# Patient Record
Sex: FEMALE | Race: BLACK OR AFRICAN AMERICAN | Employment: STUDENT | URBAN - METROPOLITAN AREA
[De-identification: names, ages, dates, MRNs, and addresses within clinical notes are randomized per-mention and may not be internally consistent; named-entity substitution may affect disease eponyms.]

---
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
## 2023-07-03 ENCOUNTER — APPOINTMENT (OUTPATIENT)
Dept: OTHER | Facility: HOSPITAL | Age: 18
End: 2023-07-03
Attending: PREVENTIVE MEDICINE

## 2023-07-05 ENCOUNTER — APPOINTMENT (OUTPATIENT)
Dept: OTHER | Facility: HOSPITAL | Age: 18
End: 2023-07-05
Attending: PREVENTIVE MEDICINE

## 2023-08-01 ENCOUNTER — APPOINTMENT (OUTPATIENT)
Dept: GENERAL RADIOLOGY | Age: 18
End: 2023-08-01
Attending: STUDENT IN AN ORGANIZED HEALTH CARE EDUCATION/TRAINING PROGRAM
Payer: MEDICAID

## 2023-08-01 ENCOUNTER — HOSPITAL ENCOUNTER (EMERGENCY)
Age: 18
Discharge: HOME | End: 2023-08-01
Attending: STUDENT IN AN ORGANIZED HEALTH CARE EDUCATION/TRAINING PROGRAM
Payer: MEDICAID

## 2023-08-01 ENCOUNTER — OFFICE VISIT (OUTPATIENT)
Dept: FAMILY MEDICINE CLINIC | Facility: CLINIC | Age: 18
End: 2023-08-01
Payer: MEDICAID

## 2023-08-01 VITALS
DIASTOLIC BLOOD PRESSURE: 87 MMHG | OXYGEN SATURATION: 99 % | SYSTOLIC BLOOD PRESSURE: 133 MMHG | WEIGHT: 160 LBS | HEIGHT: 65 IN | RESPIRATION RATE: 16 BRPM | HEART RATE: 112 BPM | BODY MASS INDEX: 26.66 KG/M2 | TEMPERATURE: 98 F

## 2023-08-01 VITALS
TEMPERATURE: 98 F | WEIGHT: 160 LBS | OXYGEN SATURATION: 100 % | SYSTOLIC BLOOD PRESSURE: 118 MMHG | HEIGHT: 65 IN | RESPIRATION RATE: 18 BRPM | HEART RATE: 88 BPM | DIASTOLIC BLOOD PRESSURE: 86 MMHG | BODY MASS INDEX: 26.66 KG/M2

## 2023-08-01 DIAGNOSIS — R06.02 SHORTNESS OF BREATH: Primary | ICD-10-CM

## 2023-08-01 DIAGNOSIS — J45.21 MILD INTERMITTENT ASTHMA WITH EXACERBATION: Primary | ICD-10-CM

## 2023-08-01 DIAGNOSIS — Z87.09 HISTORY OF ASTHMA: ICD-10-CM

## 2023-08-01 PROCEDURE — 3008F BODY MASS INDEX DOCD: CPT | Performed by: NURSE PRACTITIONER

## 2023-08-01 PROCEDURE — 99284 EMERGENCY DEPT VISIT MOD MDM: CPT

## 2023-08-01 PROCEDURE — 3079F DIAST BP 80-89 MM HG: CPT | Performed by: NURSE PRACTITIONER

## 2023-08-01 PROCEDURE — 99213 OFFICE O/P EST LOW 20 MIN: CPT | Performed by: NURSE PRACTITIONER

## 2023-08-01 PROCEDURE — 93010 ELECTROCARDIOGRAM REPORT: CPT

## 2023-08-01 PROCEDURE — 3074F SYST BP LT 130 MM HG: CPT | Performed by: NURSE PRACTITIONER

## 2023-08-01 PROCEDURE — 93005 ELECTROCARDIOGRAM TRACING: CPT

## 2023-08-01 PROCEDURE — 94640 AIRWAY INHALATION TREATMENT: CPT

## 2023-08-01 PROCEDURE — 71045 X-RAY EXAM CHEST 1 VIEW: CPT | Performed by: STUDENT IN AN ORGANIZED HEALTH CARE EDUCATION/TRAINING PROGRAM

## 2023-08-01 RX ORDER — ALBUTEROL SULFATE 90 UG/1
8 AEROSOL, METERED RESPIRATORY (INHALATION) ONCE
Status: COMPLETED | OUTPATIENT
Start: 2023-08-01 | End: 2023-08-01

## 2023-08-01 RX ORDER — NORETHINDRONE ACETATE AND ETHINYL ESTRADIOL 1; .02 MG/1; MG/1
1 TABLET ORAL DAILY
COMMUNITY
End: 2023-08-18

## 2023-08-01 RX ORDER — ALBUTEROL SULFATE 2.5 MG/3ML
2.5 SOLUTION RESPIRATORY (INHALATION) EVERY 4 HOURS PRN
Qty: 30 EACH | Refills: 0 | Status: SHIPPED | OUTPATIENT
Start: 2023-08-01 | End: 2023-08-31

## 2023-08-01 RX ORDER — PREDNISONE 20 MG/1
60 TABLET ORAL ONCE
Status: COMPLETED | OUTPATIENT
Start: 2023-08-01 | End: 2023-08-01

## 2023-08-01 RX ORDER — PREDNISONE 20 MG/1
40 TABLET ORAL DAILY
Qty: 10 TABLET | Refills: 0 | Status: SHIPPED | OUTPATIENT
Start: 2023-08-01 | End: 2023-08-06

## 2023-08-02 LAB
ATRIAL RATE: 84 BPM
P AXIS: 74 DEGREES
P-R INTERVAL: 144 MS
Q-T INTERVAL: 352 MS
QRS DURATION: 76 MS
QTC CALCULATION (BEZET): 415 MS
R AXIS: 82 DEGREES
T AXIS: 47 DEGREES
VENTRICULAR RATE: 84 BPM

## 2023-08-18 ENCOUNTER — OFFICE VISIT (OUTPATIENT)
Dept: FAMILY MEDICINE CLINIC | Facility: CLINIC | Age: 18
End: 2023-08-18
Payer: MEDICAID

## 2023-08-18 VITALS
RESPIRATION RATE: 18 BRPM | HEART RATE: 87 BPM | TEMPERATURE: 98 F | BODY MASS INDEX: 26.66 KG/M2 | SYSTOLIC BLOOD PRESSURE: 126 MMHG | WEIGHT: 160 LBS | DIASTOLIC BLOOD PRESSURE: 84 MMHG | HEIGHT: 65 IN | OXYGEN SATURATION: 98 %

## 2023-08-18 DIAGNOSIS — J45.20 MILD INTERMITTENT ASTHMA WITHOUT COMPLICATION: ICD-10-CM

## 2023-08-18 DIAGNOSIS — G89.29 CHRONIC BILATERAL LOW BACK PAIN WITHOUT SCIATICA: ICD-10-CM

## 2023-08-18 DIAGNOSIS — G43.009 MIGRAINE WITHOUT AURA AND WITHOUT STATUS MIGRAINOSUS, NOT INTRACTABLE: Primary | ICD-10-CM

## 2023-08-18 DIAGNOSIS — M54.50 CHRONIC BILATERAL LOW BACK PAIN WITHOUT SCIATICA: ICD-10-CM

## 2023-08-18 DIAGNOSIS — N92.6 IRREGULAR MENSTRUAL CYCLE: ICD-10-CM

## 2023-08-18 DIAGNOSIS — M54.6 CHRONIC BILATERAL THORACIC BACK PAIN: ICD-10-CM

## 2023-08-18 DIAGNOSIS — F31.9 BIPOLAR 1 DISORDER (HCC): ICD-10-CM

## 2023-08-18 DIAGNOSIS — G89.29 CHRONIC BILATERAL THORACIC BACK PAIN: ICD-10-CM

## 2023-08-18 PROCEDURE — 99204 OFFICE O/P NEW MOD 45 MIN: CPT | Performed by: FAMILY MEDICINE

## 2023-08-18 PROCEDURE — 3074F SYST BP LT 130 MM HG: CPT | Performed by: FAMILY MEDICINE

## 2023-08-18 PROCEDURE — 3008F BODY MASS INDEX DOCD: CPT | Performed by: FAMILY MEDICINE

## 2023-08-18 PROCEDURE — 3079F DIAST BP 80-89 MM HG: CPT | Performed by: FAMILY MEDICINE

## 2023-08-18 RX ORDER — SUMATRIPTAN 25 MG/1
25 TABLET, FILM COATED ORAL EVERY 2 HOUR PRN
Qty: 30 TABLET | Refills: 2 | Status: SHIPPED | OUTPATIENT
Start: 2023-08-18 | End: 2023-09-17

## 2023-08-18 RX ORDER — QUETIAPINE FUMARATE 300 MG/1
300 TABLET, FILM COATED ORAL 2 TIMES DAILY
COMMUNITY

## 2023-08-18 RX ORDER — NORETHINDRONE ACETATE AND ETHINYL ESTRADIOL, ETHINYL ESTRADIOL AND FERROUS FUMARATE 1MG-10(24)
1 KIT ORAL DAILY
COMMUNITY
Start: 2023-05-12

## 2023-08-18 RX ORDER — ONDANSETRON 4 MG/1
4 TABLET, ORALLY DISINTEGRATING ORAL EVERY 8 HOURS PRN
Qty: 21 TABLET | Refills: 0 | Status: SHIPPED | OUTPATIENT
Start: 2023-08-18 | End: 2023-08-25

## 2023-08-18 RX ORDER — ALBUTEROL SULFATE 90 UG/1
2 AEROSOL, METERED RESPIRATORY (INHALATION) EVERY 4 HOURS PRN
COMMUNITY
Start: 2022-09-02

## 2023-08-23 ENCOUNTER — PATIENT MESSAGE (OUTPATIENT)
Dept: FAMILY MEDICINE CLINIC | Facility: CLINIC | Age: 18
End: 2023-08-23

## 2023-09-11 ENCOUNTER — TELEPHONE (OUTPATIENT)
Dept: FAMILY MEDICINE CLINIC | Facility: CLINIC | Age: 18
End: 2023-09-11

## 2023-10-04 ENCOUNTER — PATIENT MESSAGE (OUTPATIENT)
Dept: FAMILY MEDICINE CLINIC | Facility: CLINIC | Age: 18
End: 2023-10-04

## 2023-10-05 ENCOUNTER — TELEPHONE (OUTPATIENT)
Dept: FAMILY MEDICINE CLINIC | Facility: CLINIC | Age: 18
End: 2023-10-05

## 2023-10-05 NOTE — TELEPHONE ENCOUNTER
From: Cherrie Reyez  To: Nina Youssef  Sent: 10/4/2023 8:33 PM CDT  Subject: Urgent! Hi Dr. Kelsey Overall,    I would like to kindly request that you sign the highlighted fields of this sheet and get it back to me, please. Thank you!

## 2023-10-05 NOTE — TELEPHONE ENCOUNTER
Patient needs appointment for physical, 8/18/23 was not a physical, she had multiple concerns addressed at that appointment, please see notes and visit diagnosis.

## 2023-10-05 NOTE — TELEPHONE ENCOUNTER
Mom, Tushar Duarte, dropped off paperwork for Medical Report on an Adult in a [facility] Form that needs to be completed/signed by Dr. Ailyn Jimenez. Pt was seen for her physical DOS 8/18/23. Form was placed in Dr. Andrea Wong in-box.

## 2023-10-06 NOTE — TELEPHONE ENCOUNTER
Pt scheduled for    Future Appointments   Date Time Provider [facility]   10/11/2023  9:40 AM Ravi Hudson MD [facility]     Work physical

## 2023-10-11 ENCOUNTER — OFFICE VISIT (OUTPATIENT)
Dept: FAMILY MEDICINE CLINIC | Facility: CLINIC | Age: 18
End: 2023-10-11
Payer: MEDICAID

## 2023-10-11 VITALS
SYSTOLIC BLOOD PRESSURE: 122 MMHG | HEIGHT: 65 IN | OXYGEN SATURATION: 98 % | BODY MASS INDEX: 27.16 KG/M2 | RESPIRATION RATE: 18 BRPM | TEMPERATURE: 98 F | WEIGHT: 163 LBS | HEART RATE: 87 BPM | DIASTOLIC BLOOD PRESSURE: 80 MMHG

## 2023-10-11 DIAGNOSIS — Z00.00 ROUTINE GENERAL MEDICAL EXAMINATION AT A HEALTH CARE FACILITY: Primary | ICD-10-CM

## 2023-10-11 PROCEDURE — 3079F DIAST BP 80-89 MM HG: CPT | Performed by: FAMILY MEDICINE

## 2023-10-11 PROCEDURE — 3074F SYST BP LT 130 MM HG: CPT | Performed by: FAMILY MEDICINE

## 2023-10-11 PROCEDURE — 99395 PREV VISIT EST AGE 18-39: CPT | Performed by: FAMILY MEDICINE

## 2023-10-11 PROCEDURE — 3008F BODY MASS INDEX DOCD: CPT | Performed by: FAMILY MEDICINE

## 2023-11-06 ENCOUNTER — OFFICE VISIT (OUTPATIENT)
Dept: FAMILY MEDICINE CLINIC | Facility: CLINIC | Age: 18
End: 2023-11-06
Payer: MEDICAID

## 2023-11-06 VITALS
SYSTOLIC BLOOD PRESSURE: 118 MMHG | WEIGHT: 160 LBS | TEMPERATURE: 98 F | BODY MASS INDEX: 26.66 KG/M2 | HEART RATE: 98 BPM | RESPIRATION RATE: 16 BRPM | HEIGHT: 65 IN | OXYGEN SATURATION: 98 % | DIASTOLIC BLOOD PRESSURE: 86 MMHG

## 2023-11-06 DIAGNOSIS — J06.9 UPPER RESPIRATORY TRACT INFECTION, UNSPECIFIED TYPE: Primary | ICD-10-CM

## 2023-11-06 PROCEDURE — 3079F DIAST BP 80-89 MM HG: CPT | Performed by: NURSE PRACTITIONER

## 2023-11-06 PROCEDURE — 3074F SYST BP LT 130 MM HG: CPT | Performed by: NURSE PRACTITIONER

## 2023-11-06 PROCEDURE — 87637 SARSCOV2&INF A&B&RSV AMP PRB: CPT | Performed by: NURSE PRACTITIONER

## 2023-11-06 PROCEDURE — 99213 OFFICE O/P EST LOW 20 MIN: CPT | Performed by: NURSE PRACTITIONER

## 2023-11-06 PROCEDURE — 3008F BODY MASS INDEX DOCD: CPT | Performed by: NURSE PRACTITIONER

## 2023-11-07 LAB
FLUAV + FLUBV RNA SPEC NAA+PROBE: NEGATIVE
FLUAV + FLUBV RNA SPEC NAA+PROBE: NEGATIVE
RSV RNA SPEC NAA+PROBE: POSITIVE
SARS-COV-2 RNA RESP QL NAA+PROBE: NOT DETECTED

## 2024-04-09 ENCOUNTER — HOSPITAL ENCOUNTER (OUTPATIENT)
Age: 19
Discharge: HOME | End: 2024-04-09
Payer: MEDICAID

## 2024-04-09 VITALS
DIASTOLIC BLOOD PRESSURE: 76 MMHG | OXYGEN SATURATION: 99 % | HEART RATE: 83 BPM | TEMPERATURE: 97 F | WEIGHT: 170 LBS | HEIGHT: 65 IN | RESPIRATION RATE: 18 BRPM | SYSTOLIC BLOOD PRESSURE: 120 MMHG | BODY MASS INDEX: 28.32 KG/M2

## 2024-04-09 DIAGNOSIS — J02.9 SORE THROAT: ICD-10-CM

## 2024-04-09 DIAGNOSIS — J01.40 ACUTE NON-RECURRENT PANSINUSITIS: Primary | ICD-10-CM

## 2024-04-09 DIAGNOSIS — J02.9 VIRAL PHARYNGITIS: ICD-10-CM

## 2024-04-09 LAB
POCT INFLUENZA A: NEGATIVE
POCT INFLUENZA B: NEGATIVE
S PYO AG THROAT QL: NEGATIVE
SARS-COV-2 RNA RESP QL NAA+PROBE: NOT DETECTED

## 2024-04-09 PROCEDURE — 87880 STREP A ASSAY W/OPTIC: CPT | Performed by: PHYSICIAN ASSISTANT

## 2024-04-09 PROCEDURE — 99204 OFFICE O/P NEW MOD 45 MIN: CPT | Performed by: PHYSICIAN ASSISTANT

## 2024-04-09 PROCEDURE — 87502 INFLUENZA DNA AMP PROBE: CPT | Performed by: PHYSICIAN ASSISTANT

## 2024-04-09 PROCEDURE — U0002 COVID-19 LAB TEST NON-CDC: HCPCS | Performed by: PHYSICIAN ASSISTANT

## 2024-04-09 RX ORDER — AMOXICILLIN AND CLAVULANATE POTASSIUM 875; 125 MG/1; MG/1
1 TABLET, FILM COATED ORAL 2 TIMES DAILY
Qty: 20 TABLET | Refills: 0 | Status: SHIPPED | OUTPATIENT
Start: 2024-04-09 | End: 2024-04-19

## 2024-04-09 RX ORDER — LORATADINE 10 MG/1
10 TABLET ORAL DAILY
Qty: 30 TABLET | Refills: 0 | Status: SHIPPED | OUTPATIENT
Start: 2024-04-09 | End: 2024-05-09

## 2024-04-09 RX ORDER — FLUTICASONE PROPIONATE 50 MCG
2 SPRAY, SUSPENSION (ML) NASAL DAILY
Qty: 16 G | Refills: 0 | Status: SHIPPED | OUTPATIENT
Start: 2024-04-09 | End: 2024-05-09

## 2024-04-09 NOTE — DISCHARGE INSTRUCTIONS
Increase fluids and rest  Ibuprofen and tylenol as needed  Use the flonase as we discussed for at least 2 weeks   Use the antihistamine daily for at least 2 weeks   Take augmentin twice a day until gone   Follow up with primary care doctor in 48 hours  Return to the ER if symptoms worsen

## 2024-04-09 NOTE — ED INITIAL ASSESSMENT (HPI)
PT C/O MULTIPLE SYMPTOMS FOR THE LAST 3 DAYS. PT C/O SORE THROAT, SHORTNESS OF BREATH, BODY ACHE AND COUGH. PT STATES SHE'S HAD INTERMITTENT SYMPTOMS FOR SEVERAL WEEKS.  PT STATES SHE IS ASTHMATIC.

## 2024-04-09 NOTE — ED PROVIDER NOTES
Patient Seen in: [facility]      History     Chief Complaint   Patient presents with    Cough/URI    Difficulty Breathing    Sore Throat    Body ache and/or chills     Stated Complaint: sore throat body aches    Subjective:   The history is provided by the patient.       19-year-old female with past med history of eczema and asthma presents to the urgent care due to throat for the past 3 days.  Subjective body aches and chills but no true fevers trismus drooling or muffled voice.  Copious nasal congestion and facial pressure for the past 2 weeks.  Had a mild dry cough but no wheezing, chest pain or shortness of breath.  Has only had increased using her albuterol inhaler roughly 3 times a week.  no other medications taken at home.    Objective:   Past Medical History:   Diagnosis Date    Asthma (HCC)     Eczema     Migraines               History reviewed. No pertinent surgical history.             Social History     Socioeconomic History    Marital status: Single   Tobacco Use    Smoking status: Never    Smokeless tobacco: Never   Vaping Use    Vaping Use: Never used   Substance and Sexual Activity    Alcohol use: Not Currently    Drug use: Never   Other Topics Concern    Caffeine Concern No    Exercise No    Seat Belt No    Special Diet No    Stress Concern No    Weight Concern No              Review of Systems   Constitutional:  Positive for chills and fatigue. Negative for fever.   HENT:  Positive for congestion, sinus pressure, sinus pain and sore throat. Negative for ear discharge, ear pain, trouble swallowing and voice change.    Respiratory: Negative.     Cardiovascular: Negative.    Gastrointestinal: Negative.    Neurological: Negative.        Positive for stated complaint: sore throat body aches  Other systems are as noted in HPI.  Constitutional and vital signs reviewed.      All other systems reviewed and negative except as noted above.    Physical Exam     ED Triage Vitals   BP  04/09/24 0932 120/76   Pulse 04/09/24 0927 83   Resp 04/09/24 0927 18   Temp 04/09/24 0927 97.4 °F (36.3 °C)   Temp src 04/09/24 0927 Oral   SpO2 04/09/24 0927 99 %   O2 Device 04/09/24 0927 None (Room air)       Current:/76   Pulse 83   Temp 97.4 °F (36.3 °C) (Oral)   Resp 18   Ht 165.1 cm (5' 5\")   Wt 77.1 kg   LMP 11/06/2023 (Approximate)   SpO2 99%   BMI 28.29 kg/m²         Physical Exam  Vitals and nursing note reviewed.   Constitutional:       General: She is not in acute distress.     Appearance: She is well-developed. She is not toxic-appearing.   HENT:      Head: Normocephalic.      Right Ear: Tympanic membrane, ear canal and external ear normal.      Left Ear: Tympanic membrane, ear canal and external ear normal.      Nose: Congestion present.      Comments: Nasal turbinated erythematous and edematous      Mouth/Throat:      Mouth: Mucous membranes are moist.      Pharynx: No posterior oropharyngeal erythema.   Eyes:      Extraocular Movements: Extraocular movements intact.      Conjunctiva/sclera: Conjunctivae normal.      Pupils: Pupils are equal, round, and reactive to light.   Cardiovascular:      Rate and Rhythm: Normal rate and regular rhythm.   Pulmonary:      Effort: Pulmonary effort is normal.      Breath sounds: Normal breath sounds.   Musculoskeletal:         General: Normal range of motion.      Cervical back: Normal range of motion.   Lymphadenopathy:      Cervical: No cervical adenopathy.   Skin:     General: Skin is warm.   Neurological:      General: No focal deficit present.      Mental Status: She is alert and oriented to person, place, and time.   Psychiatric:         Mood and Affect: Mood normal.         Behavior: Behavior normal.               ED Course     Labs Reviewed   POCT RAPID STREP - Normal   POCT FLU TEST - Normal    Narrative:     This assay is a rapid molecular in vitro test utilizing nucleic acid amplification of influenza A and B viral RNA.   RAPID SARS-COV-2  BY PCR - Normal                      MDM   Ddx-strep pharyngitis  influenza, COVID, sinusitis      Exam the patient is afebrile nontoxic.  Vital signs are stable.  She does have copious nasal congestion with nasal turbinates erythematous and boggy.  Bilateral TMs are unremarkable.  Posterior pharynx shows postnasal drip otherwise no acute findings.  Patient.  No wheezing.  Rapid strep is negative.  Patient requesting COVID and influenza testing due to her current employer meant in a .  All of which are negative. Due to worsening facial pressure and nasal congestion for over 2 weeks concern for secondary bacterial component, will prescribe flonase, Claritin along with Augmentin.  Discussed at length with the patient at home care strict return precautions and importance close follow-up.  All questions were answered and the patient is comfortable to treatment plan and discharge home                             Medical Decision Making  Problems Addressed:  Acute non-recurrent pansinusitis: acute illness or injury  Viral pharyngitis: acute illness or injury    Amount and/or Complexity of Data Reviewed  Labs: ordered. Decision-making details documented in ED Course.    Risk  OTC drugs.  Prescription drug management.        Disposition and Plan     Clinical Impression:  1. Acute non-recurrent pansinusitis    2. Sore throat    3. Viral pharyngitis         Disposition:  Discharge  4/9/2024 10:20 am    Follow-up:  Oneyda Youssef MD  [address] [place] [address]  [phone]                Medications Prescribed:  Discharge Medication List as of 4/9/2024 10:23 AM        START taking these medications    Details   fluticasone propionate 50 MCG/ACT Nasal Suspension 2 sprays by Nasal route daily., Normal, Disp-16 g, R-0      loratadine 10 MG Oral Tab Take 1 tablet (10 mg total) by mouth daily., Normal, Disp-30 tablet, R-0      amoxicillin clavulanate 875-125 MG Oral Tab Take 1 tablet by mouth 2  (two) times daily for 10 days., Normal, Disp-20 tablet, R-0

## 2024-04-16 RX ORDER — NORETHINDRONE ACETATE AND ETHINYL ESTRADIOL, ETHINYL ESTRADIOL AND FERROUS FUMARATE 1MG-10(24)
1 KIT ORAL DAILY
Qty: 28 TABLET | Refills: 0 | OUTPATIENT
Start: 2024-04-16

## 2024-04-20 ENCOUNTER — OFFICE VISIT (OUTPATIENT)
Dept: FAMILY MEDICINE CLINIC | Facility: CLINIC | Age: 19
End: 2024-04-20
Payer: MEDICAID

## 2024-04-20 VITALS
TEMPERATURE: 98 F | BODY MASS INDEX: 28.66 KG/M2 | HEART RATE: 102 BPM | RESPIRATION RATE: 18 BRPM | SYSTOLIC BLOOD PRESSURE: 110 MMHG | DIASTOLIC BLOOD PRESSURE: 82 MMHG | WEIGHT: 172 LBS | HEIGHT: 65 IN | OXYGEN SATURATION: 98 %

## 2024-04-20 DIAGNOSIS — Z12.4 SCREENING FOR CERVICAL CANCER: ICD-10-CM

## 2024-04-20 DIAGNOSIS — Z30.41 SURVEILLANCE OF PREVIOUSLY PRESCRIBED CONTRACEPTIVE PILL: Primary | ICD-10-CM

## 2024-04-20 DIAGNOSIS — Z11.3 ROUTINE SCREENING FOR STI (SEXUALLY TRANSMITTED INFECTION): ICD-10-CM

## 2024-04-20 PROCEDURE — 87624 HPV HI-RISK TYP POOLED RSLT: CPT | Performed by: FAMILY MEDICINE

## 2024-04-20 PROCEDURE — 99213 OFFICE O/P EST LOW 20 MIN: CPT | Performed by: FAMILY MEDICINE

## 2024-04-20 PROCEDURE — 87591 N.GONORRHOEAE DNA AMP PROB: CPT | Performed by: FAMILY MEDICINE

## 2024-04-20 PROCEDURE — 87491 CHLMYD TRACH DNA AMP PROBE: CPT | Performed by: FAMILY MEDICINE

## 2024-04-20 PROCEDURE — 88175 CYTOPATH C/V AUTO FLUID REDO: CPT | Performed by: FAMILY MEDICINE

## 2024-04-20 RX ORDER — NORETHINDRONE ACETATE AND ETHINYL ESTRADIOL, ETHINYL ESTRADIOL AND FERROUS FUMARATE 1MG-10(24)
1 KIT ORAL DAILY
Qty: 84 TABLET | Refills: 3 | Status: SHIPPED | OUTPATIENT
Start: 2024-04-20

## 2024-04-20 NOTE — PROGRESS NOTES
Jassi Deleon is a 19 year old female.  Chief Complaint   Patient presents with    Pap    Medication Follow-Up     HPI:   Jassi Deleon is a 19 year old female with history of bipolar disorder seen for her pap and breast exam. Periods are regular.  Needs refill on her OCP, states periods are not regular and the flow is very light on the ocp.    ALLERGY:     Allergies   Allergen Reactions    Lanolin OTHER (SEE COMMENTS)    Ra Pure Aloe OTHER (SEE COMMENTS)     MEDICATIONS:     Current Outpatient Medications   Medication Sig Dispense Refill    LO LOESTRIN FE 1 MG-10 MCG / 10 MCG Oral Tab Take 1 tablet by mouth daily. 84 tablet 3    fluticasone propionate 50 MCG/ACT Nasal Suspension 2 sprays by Nasal route daily. 16 g 0    loratadine 10 MG Oral Tab Take 1 tablet (10 mg total) by mouth daily. 30 tablet 0    QUEtiapine 300 MG Oral Tab Take 1 tablet (300 mg total) by mouth 2 (two) times daily.      albuterol 108 (90 Base) MCG/ACT Inhalation Aero Soln Inhale 2 puffs into the lungs every 4 (four) hours as needed for Shortness of Breath.      hydrOXYzine HCl 25 MG Oral Tab Take 1 to 2 tablets 30 min before bed as needed for itching. Start with 1 tab and may increase to 2 as needed.      triamcinolone acetonide 0.1 % External Cream Apply a thin layer to the affected rough areas of the body until smooth        Past Medical History:    Asthma (HCC)    Eczema    Migraines      Social History:  Social History     Socioeconomic History    Marital status: Single   Tobacco Use    Smoking status: Never    Smokeless tobacco: Never   Vaping Use    Vaping status: Never Used   Substance and Sexual Activity    Alcohol use: Not Currently    Drug use: Never   Other Topics Concern    Caffeine Concern No    Exercise No    Seat Belt No    Special Diet No    Stress Concern No    Weight Concern No     Social Determinants of Health     Financial Resource Strain: Unknown (4/22/2022)    Received from [facility],  [facility]    Overall Financial Resource Strain (CARDIA)     Difficulty of Paying Living Expenses: Patient declined   Food Insecurity: Unknown (4/22/2022)    [facility], [facility]    Hunger Vital Sign     Worried About Running Out of Food in the Last Year: Patient declined     Ran Out of Food in the Last Year: Patient declined   Transportation Needs: Unknown (4/22/2022)    [facility] from [facility], [facility]    PRAPARE - Transportation     Lack of Transportation (Medical): Patient declined     Lack of Transportation (Non-Medical): Patient declined    [facility]    Housing Stability        REVIEW OF SYSTEMS:   Review of Systems   Genitourinary:  Negative for vaginal discharge, genital sores, vaginal pain, menstrual problem, pelvic pain, sexual dysfunction, breast mass and breast pain.     EXAM:   /82   Pulse 102   Temp 98 °F (36.7 °C) (Temporal)   Resp 18   Ht 5' 5\" (1.651 m)   Wt 172 lb (78 kg)   LMP 03/24/2024 (Approximate)   SpO2 98%   BMI 28.62 kg/m²   Physical Exam  Constitutional:       Appearance: Normal appearance. She is normal weight.   Cardiovascular:      Rate and Rhythm: Normal rate and regular rhythm.      Heart sounds: No murmur heard.  Pulmonary:      Effort: Pulmonary effort is normal.      Breath sounds: Normal breath sounds.   Chest:   Breasts:     Right: Normal. No swelling, inverted nipple, mass, nipple discharge, skin change or tenderness.      Left: Normal. No swelling, inverted nipple, mass, nipple discharge, skin change or tenderness.   Abdominal:      Hernia: There is no hernia in the left inguinal area or right inguinal area.   Genitourinary:     General: Normal vulva.      Exam position: Lithotomy position.      Labia:         Right: No rash or lesion.         Left: No rash or lesion.        Urethra: No urethral lesion.      Vagina: Normal. No vaginal discharge, erythema, bleeding or lesions.      Cervix: Normal. No cervical motion tenderness, discharge, friability, lesion or erythema.      Uterus: Normal. Not deviated, not enlarged, not fixed, not tender and no uterine prolapse.       Adnexa: Right adnexa normal and left adnexa normal.        Right: No mass, tenderness or fullness.          Left: No mass, tenderness or fullness.        Rectum: Normal.   Lymphadenopathy:      Upper Body:      Right upper body: No supraclavicular, axillary or pectoral adenopathy.      Left upper body: No supraclavicular, axillary or pectoral adenopathy.      Lower Body: No right inguinal adenopathy. No left inguinal adenopathy.   Neurological:      Mental Status: She is alert.       ASSESSMENT AND PLAN:   Jassi was seen today for pap and medication follow-up.    Diagnoses and all orders for this visit:    Surveillance of previously prescribed contraceptive pill  -     LO LOESTRIN FE 1 MG-10 MCG / 10 MCG Oral Tab; Take 1 tablet by mouth daily.    Screening for cervical cancer  -     Cancel: Thin Prep Pap every 3 years with Cytology Alone; Future  -     ThinPrep PAP with HPV Reflex Request; Future  -     ThinPrep PAP with HPV Reflex Request    Routine screening for STI (sexually transmitted infection)  -     Chlamydia/Gc Amplification Urine; Future  -     Chlamydia/Gc Amplification Urine         The 21st Century Cures Act makes medical notes like these available to patients in the interest of transparency. Please be advised this is a medical document. Medical documents are intended to carry relevant information, facts as evident, and the clinical opinion of the practitioner. The medical note is intended as peer to peer communication and may appear blunt or direct. It is written in medical language and may contain abbreviations or verbiage that are unfamiliar.

## 2024-04-22 LAB
C TRACH DNA SPEC QL NAA+PROBE: NEGATIVE
N GONORRHOEA DNA SPEC QL NAA+PROBE: NEGATIVE

## 2024-04-24 LAB
.: NORMAL
.: NORMAL
HPV I/H RISK 1 DNA SPEC QL NAA+PROBE: NEGATIVE

## 2024-07-22 ENCOUNTER — OFFICE VISIT (OUTPATIENT)
Dept: FAMILY MEDICINE CLINIC | Facility: CLINIC | Age: 19
End: 2024-07-22
Payer: MEDICAID

## 2024-07-22 VITALS
RESPIRATION RATE: 18 BRPM | BODY MASS INDEX: 27.32 KG/M2 | HEART RATE: 116 BPM | WEIGHT: 164 LBS | TEMPERATURE: 98 F | HEIGHT: 65 IN | OXYGEN SATURATION: 97 % | DIASTOLIC BLOOD PRESSURE: 82 MMHG | SYSTOLIC BLOOD PRESSURE: 110 MMHG

## 2024-07-22 DIAGNOSIS — G89.29 CHRONIC MIDLINE THORACIC BACK PAIN: Primary | ICD-10-CM

## 2024-07-22 DIAGNOSIS — M54.6 CHRONIC MIDLINE THORACIC BACK PAIN: Primary | ICD-10-CM

## 2024-07-22 DIAGNOSIS — N62 LARGE BREASTS: ICD-10-CM

## 2024-07-22 PROCEDURE — 99213 OFFICE O/P EST LOW 20 MIN: CPT | Performed by: FAMILY MEDICINE

## 2024-07-22 RX ORDER — TIZANIDINE 2 MG/1
2 TABLET ORAL NIGHTLY PRN
Qty: 15 TABLET | Refills: 0 | Status: SHIPPED | OUTPATIENT
Start: 2024-07-22 | End: 2024-08-06

## 2024-07-22 NOTE — PATIENT INSTRUCTIONS
Please see if seeing a plastic surgeon is covered by medicaid, please let me know if you need a referral.

## 2024-07-22 NOTE — PROGRESS NOTES
Jassi Deleon is a 19 year old female.  Chief Complaint   Patient presents with    Back Pain     Having back pain for 10 years     Contraception     Wants to discuss birth control      HPI:   Jassi Deleon is a 19 year old female complaining of Mid back pain ongoing for a while feels it is due to her heavy breasts.  Does not get bra lines on her shoulder.Wears a sports bra and regular bra. Patient 34-36 DD-DDD current;y.    Works in  and feels the pain is worse at the end of the day, tries stretching, warm shower, massage and topical analgesic with some relief of pain but it gets worse the next day.    Feels her breasts are big, states when she sleeps has some chest pain, breast pain and back pain.    ALLERGY:     Allergies   Allergen Reactions    Lanolin OTHER (SEE COMMENTS)    Ra Pure Aloe OTHER (SEE COMMENTS)     MEDICATIONS:     Current Outpatient Medications   Medication Sig Dispense Refill    LO LOESTRIN FE 1 MG-10 MCG / 10 MCG Oral Tab Take 1 tablet by mouth daily. 84 tablet 3    QUEtiapine 300 MG Oral Tab Take 1 tablet (300 mg total) by mouth 2 (two) times daily.      albuterol 108 (90 Base) MCG/ACT Inhalation Aero Soln Inhale 2 puffs into the lungs every 4 (four) hours as needed for Shortness of Breath.      hydrOXYzine HCl 25 MG Oral Tab Take 1 to 2 tablets 30 min before bed as needed for itching. Start with 1 tab and may increase to 2 as needed.      triamcinolone acetonide 0.1 % External Cream Apply a thin layer to the affected rough areas of the body until smooth        Past Medical History:    Asthma (HCC)    Eczema    Migraines      Social History:  Social History     Socioeconomic History    Marital status: Single   Tobacco Use    Smoking status: Never    Smokeless tobacco: Never   Vaping Use    Vaping status: Never Used   Substance and Sexual Activity    Alcohol use: Not Currently    Drug use: Never   Other Topics Concern    Caffeine Concern No    Exercise No    Seat Belt No     Special Diet No    Stress Concern No    Weight Concern No     Social Determinants of Health     Financial Resource Strain: Unknown (4/22/2022)    [facility]    Overall Financial Resource Strain (CARDIA)     Difficulty of Paying Living Expenses: Patient declined   Food Insecurity: Unknown (4/22/2022)    [facility], [facility]    Hunger Vital Sign     Worried About Running Out of Food in the Last Year: Patient declined     Ran Out of Food in the Last Year: Patient declined   Transportation Needs: Unknown (4/22/2022)    [facility], [facility]    PRAPARE - Transportation     Lack of Transportation (Medical): Patient declined     Lack of Transportation (Non-Medical): Patient declined    [facility], [facility]    Housing Stability        REVIEW OF SYSTEMS:   A comprehensive 10 point review of systems was completed.  Pertinent positives and negatives noted in the the HPI.    EXAM:   /82   Pulse 116   Temp 98.2 °F (36.8 °C) (Temporal)   Resp 18   Ht 5' 5\" (1.651 m)   Wt 164 lb (74.4 kg)   LMP 06/26/2024 (Approximate)   SpO2 97%   BMI 27.29 kg/m²   GENERAL: well developed, well nourished,in no apparent distress  NECK: supple, no tenderness to palpation  LUNGS: clear to auscultation  CARDIO: RRR without murmur  BACK: no tenderness to palpation over the thoracic spine, + mild spasm of trapezius.    ASSESSMENT AND PLAN:   Jassi was seen today for back pain and contraception.    Diagnoses and all orders for this visit:    Chronic midline thoracic back pain  -     Physical Therapy Referral - [facility]  -     tiZANidine 2 MG Oral Tab; Take 1 tablet (2 mg total) by mouth nightly as needed.    Large breasts       - advised to see if seeing a plastic surgeon for breast  reduction surgery is covered by medicaid.       - can call with name of surgeon if referral is needed.       The 21st Century Cures Act makes medical notes like these available to patients in the interest of transparency. Please be advised this is a medical document. Medical documents are intended to carry relevant information, facts as evident, and the clinical opinion of the practitioner. The medical note is intended as peer to peer communication and may appear blunt or direct. It is written in medical language and may contain abbreviations or verbiage that are unfamiliar.

## 2024-07-25 ENCOUNTER — TELEPHONE (OUTPATIENT)
Dept: FAMILY MEDICINE CLINIC | Facility: CLINIC | Age: 19
End: 2024-07-25

## 2024-07-25 NOTE — TELEPHONE ENCOUNTER
Received fax from Pharmacy regarding potential drug interaction with tizanidine and Lo Loestrin.  Called Pharmacy to clarify.  Spoke to pharmacist who states tizanidine should be OK with Lo Loestrin.  Advised Dr. Youssef is aware of patient's medications and tizanidine is for short term use only.  OK to dispense.

## 2024-09-03 ENCOUNTER — OFFICE VISIT (OUTPATIENT)
Dept: FAMILY MEDICINE CLINIC | Facility: CLINIC | Age: 19
End: 2024-09-03
Payer: MEDICAID

## 2024-09-03 VITALS
DIASTOLIC BLOOD PRESSURE: 82 MMHG | SYSTOLIC BLOOD PRESSURE: 122 MMHG | HEIGHT: 65 IN | RESPIRATION RATE: 18 BRPM | WEIGHT: 165 LBS | OXYGEN SATURATION: 99 % | TEMPERATURE: 98 F | BODY MASS INDEX: 27.49 KG/M2 | HEART RATE: 93 BPM

## 2024-09-03 DIAGNOSIS — J45.31 MILD PERSISTENT ASTHMA WITH ACUTE EXACERBATION (HCC): Primary | ICD-10-CM

## 2024-09-03 DIAGNOSIS — Z23 NEED FOR VACCINATION: ICD-10-CM

## 2024-09-03 PROCEDURE — 99214 OFFICE O/P EST MOD 30 MIN: CPT | Performed by: FAMILY MEDICINE

## 2024-09-03 RX ORDER — FLUTICASONE FUROATE 50 UG/1
1 POWDER RESPIRATORY (INHALATION) 2 TIMES DAILY
Qty: 30 EACH | Refills: 2 | Status: SHIPPED | OUTPATIENT
Start: 2024-09-03 | End: 2024-10-03

## 2024-09-03 RX ORDER — PREDNISONE 20 MG/1
20 TABLET ORAL 2 TIMES DAILY
Qty: 10 TABLET | Refills: 0 | Status: SHIPPED | OUTPATIENT
Start: 2024-09-03 | End: 2024-09-08

## 2024-09-03 NOTE — PROGRESS NOTES
Family Medicine Progress Note  ASSESSMENT AND PLAN:  Jassi Deleon is a 19 year old female who is here for:     Jassi was seen today for asthma.    Diagnoses and all orders for this visit:    Mild persistent asthma with acute exacerbation (HCC)  -     Fluticasone Furoate (ARNUITY ELLIPTA) 50 MCG/ACT Inhalation Aerosol Powder, Breath Activated; Inhale 1 puff into the lungs in the morning and 1 puff before bedtime.  -     predniSONE 20 MG Oral Tab; Take 1 tablet (20 mg total) by mouth 2 (two) times daily for 5 days.  -      started on oral and inhaled steroid  -      advised to start with the inhaled steroid and if her symptoms are better to stay on it and use the albuterol as needed only  -     AAP reviewed    Need for vaccination  -     Tetanus-Diphth-Acell Pertussis 5-2-15.5 LF-MCG/0.5 Intramuscular Suspension; Inject 0.5 mL into the muscle once for 1 dose.  -     HPV 9-valent recombinant vaccine Intramuscular Suspension Prefilled Syringe; Inject 0.5 mL into the muscle once for 1 dose.    Other orders  -     Cancel: TdaP (Boostrix) Vaccine (> 7 Y)     The patient indicates understanding of these issues and agrees to the plan.  Follow-Up: The patient is asked to Return in about 1 week (around 9/10/2024), or if symptoms worsen or fail to improve.  .     Oneyda Youssef MD   09/03/24      CC: Asthma    HPI:   Jassi Deleon is a 19 year old female who presents for Asthma seen for follow-up, patient states her asthma symptoms have been acting up over the past couple of weeks with the weather being hot, has been needing to use her rescue inhaler almost every day.  Patient states worse on a steroid inhaler a year ago when her symptoms got bad.  States her chest has been feeling tight all the time.  Had a severe episode at work when she went out into the hot humid weather.    Needs her Tdap vaccine updated.    ALLERGY:     Allergies   Allergen Reactions    Lanolin OTHER (SEE COMMENTS)    Ra Pure Aloe OTHER  (SEE COMMENTS)     MEDICATIONS:     Current Outpatient Medications   Medication Sig Dispense Refill    Fluticasone Furoate (ARNUITY ELLIPTA) 50 MCG/ACT Inhalation Aerosol Powder, Breath Activated Inhale 1 puff into the lungs in the morning and 1 puff before bedtime. 30 each 2    predniSONE 20 MG Oral Tab Take 1 tablet (20 mg total) by mouth 2 (two) times daily for 5 days. 10 tablet 0    Tetanus-Diphth-Acell Pertussis 5-2-15.5 LF-MCG/0.5 Intramuscular Suspension Inject 0.5 mL into the muscle once for 1 dose. 0.5 mL 0    HPV 9-valent recombinant vaccine Intramuscular Suspension Prefilled Syringe Inject 0.5 mL into the muscle once for 1 dose. 0.5 mL 0    LO LOESTRIN FE 1 MG-10 MCG / 10 MCG Oral Tab Take 1 tablet by mouth daily. 84 tablet 3    QUEtiapine 300 MG Oral Tab Take 1 tablet (300 mg total) by mouth 2 (two) times daily.      albuterol 108 (90 Base) MCG/ACT Inhalation Aero Soln Inhale 2 puffs into the lungs every 4 (four) hours as needed for Shortness of Breath.      hydrOXYzine HCl 25 MG Oral Tab Take 1 to 2 tablets 30 min before bed as needed for itching. Start with 1 tab and may increase to 2 as needed.      triamcinolone acetonide 0.1 % External Cream Apply a thin layer to the affected rough areas of the body until smooth        Past Medical History:    Asthma (HCC)    Eczema    Migraines      Social History:  Social History     Socioeconomic History    Marital status: Single   Tobacco Use    Smoking status: Never    Smokeless tobacco: Never   Vaping Use    Vaping status: Never Used   Substance and Sexual Activity    Alcohol use: Not Currently    Drug use: Never   Other Topics Concern    Caffeine Concern No    Exercise No    Seat Belt No    Special Diet No    Stress Concern No    Weight Concern No     Social Determinants of Health     Financial Resource Strain: Unknown (4/22/2022)    [facility]    Overall Financial Resource Strain (CARDIA)      Difficulty of Paying Living Expenses: Patient declined   Food Insecurity: Unknown (4/22/2022)    [facility]    Hunger Vital Sign     Worried About Running Out of Food in the Last Year: Patient declined     Ran Out of Food in the Last Year: Patient declined   Transportation Needs: Unknown (4/22/2022)    [facility]    PRAPARE - Transportation     Lack of Transportation (Medical): Patient declined     Lack of Transportation (Non-Medical): Patient declined    [facility]    Housing Stability        REVIEW OF SYSTEMS:   A comprehensive 10 point review of systems was completed.  Pertinent positives and negatives noted in the the HPI.      EXAM:   /82   Pulse 93   Temp 97.8 °F (36.6 °C) (Temporal)   Resp 18   Ht 5' 5\" (1.651 m)   Wt 165 lb (74.8 kg)   LMP 08/26/2024 (Approximate)   SpO2 99%   BMI 27.46 kg/m²   GENERAL: well developed, well nourished,in no apparent distress  HEENT: atraumatic, normocephalic  NECK: supple,no adenopathy,  LUNGS: clear to auscultation, + prolonged expiratory phase, no wheezing  CARDIO: RRR without murmur      NOTE TO PATIENT: The 21st Century Cures Act makes clinical notes like these available to patients in the interest of transparency. Clinical notes are medical documents used by physicians and care providers to communicate with each other. These documents include medical language and terminology, abbreviations, and treatment information that may sound technical and at times possibly unfamiliar. In addition, at times, the verbiage may appear blunt or direct. These documents are one tool providers use to communicate relevant information and clinical opinions of the care providers in a way that allows common understanding of the clinical context.      Oneyda Youssef,  MD    09/03/24 10:52 AM

## 2024-09-04 ENCOUNTER — TELEPHONE (OUTPATIENT)
Dept: INTERNAL MEDICINE CLINIC | Facility: CLINIC | Age: 19
End: 2024-09-04

## 2024-09-04 NOTE — TELEPHONE ENCOUNTER
Received fax letter from [facility] advising approval of Arnuity Ellipta 50 mcg/ACT Aerolol Powder Breath Activated effective 1/1/2020 and will continue for as long as patient is enrolled in their Health Plan.  Copy sent to scanning.

## 2024-09-04 NOTE — TELEPHONE ENCOUNTER
Approved    Prior authorization approved  Payer: [facility] Case ID: [id number]    [phone]  Note from payer: The case has been Approved from  2020 to 2039  Approval Details    Authorized from 2020 to 2039  Electronic appeal: Not supported  View History  Pharmacy Benefits   Open Encounter ALBERTA NORRIS  [facility] ([facility])    Covered: Retail, Mail Order    Unknown: Specialty, Long-Term Care  Member ID: [id number] BIN: [id number] : 3/17/2005   Group ID: [id number] PCN: [id number] Legal sex: F   Group name: [facility]   Address: [address] [place] [id number]    Medication Being Authorized    Fluticasone Furoate (ARNUITY ELLIPTA) 50 MCG/ACT Inhalation Aerosol Powder, Breath Activated  Inhale 1 puff into the lungs in the morning and 1 puff before bedtime.  Dispense: 30 each Refills: 2   Start: 9/3/2024 End: 10/3/2024   Class: Normal Diagnoses: Mild persistent asthma with acute exacerbation (HCC)   This order has been released to its destination.  To be filled at: [id number] [place], [place] - [address] AT [facility] [address], [phone],       [facility] sent to patient.    FYI sent to DR SABINA BERNAL.

## 2024-09-04 NOTE — TELEPHONE ENCOUNTER
Neris from [place] [employer] called with question regarding prescription   Fluticasone Furoate (ARNUITY ELLIPTA) 50 MCG/ACT Inhalation Aerosol Powder, Breath Activated     She asked if we required brand name or generic. Advised if generic available, ok to dispense as generic. She confirms understanding.

## 2024-11-04 ENCOUNTER — APPOINTMENT (OUTPATIENT)
Dept: GENERAL RADIOLOGY | Age: 19
End: 2024-11-04
Payer: MEDICAID

## 2024-11-04 ENCOUNTER — HOSPITAL ENCOUNTER (EMERGENCY)
Age: 19
Discharge: HOME | End: 2024-11-04
Payer: MEDICAID

## 2024-11-04 VITALS — BODY MASS INDEX: 28.32 KG/M2 | WEIGHT: 170 LBS | HEIGHT: 65 IN

## 2024-11-04 DIAGNOSIS — S63.502A SPRAIN OF LEFT WRIST, INITIAL ENCOUNTER: Primary | ICD-10-CM

## 2024-11-04 PROCEDURE — 99283 EMERGENCY DEPT VISIT LOW MDM: CPT

## 2024-11-04 PROCEDURE — 73110 X-RAY EXAM OF WRIST: CPT

## 2024-11-05 NOTE — ED PROVIDER NOTES
Patient Seen in: [facility] [place]      History     Chief Complaint   Patient presents with    Arm or Hand Injury     Stated Complaint: left wrist injury after rolling over it with her body weight    Subjective:   19-year-old female presents  with left wrist pain.  Patient states she rolled her wrist earlier today and is abdomen hip and pain with movement since.  She denies any hand or elbow pain              Objective:     Past Medical History:    Asthma (HCC)    Eczema    Migraines              History reviewed. No pertinent surgical history.             Social History     Socioeconomic History    Marital status: Single   Tobacco Use    Smoking status: Never    Smokeless tobacco: Never   Vaping Use    Vaping status: Some Days   Substance and Sexual Activity    Alcohol use: Yes     Comment: social    Drug use: Never   Other Topics Concern    Caffeine Concern No    Exercise No    Seat Belt No    Special Diet No    Stress Concern No    Weight Concern No     Social Drivers of Health     Financial Resource Strain: Unknown (4/22/2022)    [facility]    Overall Financial Resource Strain (CARDIA)     Difficulty of Paying Living Expenses: Patient declined   Food Insecurity: Unknown (4/22/2022)    [facility]    Hunger Vital Sign     Worried About Running Out of Food in the Last Year: Patient declined     Ran Out of Food in the Last Year: Patient declined   Transportation Needs: Unknown (4/22/2022)    [facility]    PRAPARE - Transportation     Lack of Transportation (Medical): Patient declined     Lack of Transportation (Non-Medical): Patient declined    [facility]    Housing Stability                  Physical Exam     ED Triage  Vitals   BP    Pulse    Resp    Temp    Temp src    SpO2    O2 Device        Current Vitals:   No data recorded    Physical Exam  Vitals and nursing note reviewed.   Constitutional:       General: She is not in acute distress.  HENT:      Head: Normocephalic.   Cardiovascular:      Rate and Rhythm: Normal rate.   Pulmonary:      Effort: Pulmonary effort is normal.   Musculoskeletal:      Left wrist: Tenderness present. No swelling, effusion or lacerations. Decreased range of motion.        Arms:    Skin:     General: Skin is warm and dry.   Neurological:      General: No focal deficit present.      Mental Status: She is alert and oriented to person, place, and time.             ED Course   Labs Reviewed - No data to display  XR WRIST COMPLETE (MIN 3 VIEWS), LEFT (CPT=73110)    Result Date: 11/4/2024  PROCEDURE:  XR WRIST COMPLETE (MIN 3 VIEWS), LEFT (CPT=73110)  TECHNIQUE:  Three views were obtained.  COMPARISON:  None.  INDICATIONS:  left wrist injury after rolling over it with her body weight  PATIENT STATED HISTORY: (As transcribed by Technologist)  Pt rolled onto her wrist and heard a crack.               CONCLUSION:  No acute fracture or dislocation.   LOCATION:  [id number]   Dictated by (CST): Emerita Olson MD on 11/04/2024 at 6:56 PM     Finalized by (CST): Emerita Olson MD on 11/04/2024 at 6:57 PM                    [facility]      Medical Decision Making  Pertinent Labs & Imaging studies reviewed. (See chart for details).  Patient coming in with left wrist pain.   Differential diagnosis includes strain, fracture  Will discharge on Velcro wrist splint.   Patient is comfortable with this plan.     Overall Pt looks good. Non-toxic, well-hydrated and in no respiratory distress. Vital signs are reassuring. Exam is reassuring. I do not believe pt requires and additional diagnostic studies or intervention. I believe pt can be discharged home to continue evaluation as an outpatient. Follow-up provider given. Discharge  instructions given and reviewed. Return for any problems. All understand and agrees with the plan.        Problems Addressed:  Sprain of left wrist, initial encounter: acute illness or injury    Amount and/or Complexity of Data Reviewed  Radiology: ordered and independent interpretation performed. Decision-making details documented in ED Course.     Details: I reviewed the images and my independent interpreation after review is no acute fracture. Additionaly, I reviewd the radiology report as noted in ed course        Disposition and Plan     Clinical Impression:  1. Sprain of left wrist, initial encounter         Disposition:  Discharge  11/4/2024  7:09 pm    Follow-up:  No follow-up provider specified.        Medications Prescribed:  Discharge Medication List as of 11/4/2024  7:36 PM              Supplementary Documentation:

## 2024-11-18 ENCOUNTER — OFFICE VISIT (OUTPATIENT)
Dept: FAMILY MEDICINE CLINIC | Facility: CLINIC | Age: 19
End: 2024-11-18
Payer: MEDICAID

## 2024-11-18 ENCOUNTER — TELEPHONE (OUTPATIENT)
Dept: FAMILY MEDICINE CLINIC | Facility: CLINIC | Age: 19
End: 2024-11-18

## 2024-11-18 VITALS
OXYGEN SATURATION: 98 % | BODY MASS INDEX: 27.49 KG/M2 | HEART RATE: 107 BPM | DIASTOLIC BLOOD PRESSURE: 78 MMHG | SYSTOLIC BLOOD PRESSURE: 110 MMHG | WEIGHT: 165 LBS | HEIGHT: 65 IN | RESPIRATION RATE: 18 BRPM | TEMPERATURE: 98 F

## 2024-11-18 DIAGNOSIS — J45.40: Primary | ICD-10-CM

## 2024-11-18 DIAGNOSIS — Z71.6 ENCOUNTER FOR TOBACCO USE CESSATION COUNSELING: ICD-10-CM

## 2024-11-18 DIAGNOSIS — Z02.89 ENCOUNTER FOR COMPLETION OF FORM WITH PATIENT: ICD-10-CM

## 2024-11-18 DIAGNOSIS — F41.1 GAD (GENERALIZED ANXIETY DISORDER): ICD-10-CM

## 2024-11-18 DIAGNOSIS — F31.32 BIPOLAR 1 DISORDER, DEPRESSED, MODERATE (HCC): ICD-10-CM

## 2024-11-18 RX ORDER — FLUTICASONE FUROATE 200 UG/1
1 POWDER RESPIRATORY (INHALATION) DAILY
Qty: 30 EACH | Refills: 1 | Status: SHIPPED | OUTPATIENT
Start: 2024-11-18

## 2024-11-18 RX ORDER — FLUTICASONE FUROATE 100 UG/1
1 POWDER RESPIRATORY (INHALATION) DAILY
COMMUNITY
Start: 2024-09-17 | End: 2024-11-18

## 2024-11-18 NOTE — PROGRESS NOTES
Family Medicine Progress Note  ASSESSMENT AND PLAN:  Jassi Deleon is a 19 year old female who is here for:     Jassi was seen today for forms completion.    Diagnoses and all orders for this visit:    Asthma, moderate persistent, poorly-controlled (HCC)  -     fluticasone furoate (ARNUITY ELLIPTA) 200 MCG/ACT Inhalation Aerosol Powder, Breath Activated; Inhale 1 puff into the lungs daily.  -    dose of Arnuity increased to 200 mg  -    advised to take albuterol as needed  -    AAP reviewed    Encounter for tobacco use cessation counseling       - encouraged to quit smoking    Bipolar 1 disorder, depressed, moderate (HCC)      - advised to continue Seroquel      - advised patient to follow up with psychiatrist at [facility]      - discuss accommodations with his psychiatrist     BHARAT (generalized anxiety disorder)       -  encouraged patient to follow up with psychiatrist.    Encounter for completion of form with patient     - FMLA forms completed       The patient indicates understanding of these issues and agrees to the plan.  Follow-Up: The patient is asked to return in Return in about 3 months (around 2/18/2025) for asthma.  .     Oneyda Youssef MD   11/18/24      CC: Forms Completion    HPI:   Jassi Deleon is a 19 year old female who presents for Forms Completion     Was on prednisone for 5 days for asthma flare up and finished 11/11, states has been having more shortness of breath,using her Arnuity daily, has been using albuterol either daily or every other day for the past month and a half, has shortness of breath, chest tightness and wheezing.  Needs FMLA form for work accomodation.  ACT is 9    States restarted her Seroquel for her bipolar disorder, wants to know if accomodation for work due to her bipolar disorder can be completed by me.    BHARAT-7 Scale       Feeling nervous, anxious, or on edge: Nearly every day  Not being able to stop or control worrying: Nearly every  day  Worrying too much about different things   : Nearly every day  Trouble relaxing: Nearly every day  Being so restless that it's hard to sit still: Nearly every day  Becoming easily annoyed or irritable: Nearly every day  Feeling afraid as if something awful might happen: Not at all    BHARAT 7 Total Score: 18            PHQ9 Scale     1. Little interest or pleasure in doing things: Nearly every day  2. Feeling down, depressed, or hopeless: Nearly every day  3. Trouble falling or staying asleep, or sleeping too much: Nearly every day  4. Feeling tired or having little energy: Nearly every day  5. Poor appetite or overeating: Not at all  6. Feeling bad about yourself - or that you are a failure or have let yourself or your family down: Not at all  7. Trouble concentrating on things, such as reading the newspaper or watching television: Nearly every day  8. Moving or speaking so slowly that other people could have noticed. Or the opposite - being so fidgety or restless that you have been moving around a lot more than usual: Nearly every day  9. Thoughts that you would be better off dead, or of hurting yourself in some way: Not at all  PHQ-9 TOTAL SCORE: 18           ALLERGY:     Allergies as of 11/18/2024 - Review Complete 11/18/2024   Allergen Reaction Noted    Lanolin OTHER (SEE COMMENTS) 02/11/2010    Ra pure aloe OTHER (SEE COMMENTS) 02/11/2010     MEDICATIONS:     Current Outpatient Medications   Medication Sig Dispense Refill    ARNUITY ELLIPTA 100 MCG/ACT Inhalation Aerosol Powder, Breath Activated Inhale 1 puff into the lungs daily.      LO LOESTRIN FE 1 MG-10 MCG / 10 MCG Oral Tab Take 1 tablet by mouth daily. 84 tablet 3    QUEtiapine 300 MG Oral Tab Take 1 tablet (300 mg total) by mouth 2 (two) times daily.      albuterol 108 (90 Base) MCG/ACT Inhalation Aero Soln Inhale 2 puffs into the lungs every 4 (four) hours as needed for Shortness of Breath.      hydrOXYzine HCl 25 MG Oral Tab Take 1 to 2 tablets 30  min before bed as needed for itching. Start with 1 tab and may increase to 2 as needed.      triamcinolone acetonide 0.1 % External Cream Apply a thin layer to the affected rough areas of the body until smooth (Patient not taking: Reported on 11/4/2024)        Past Medical History:    Allergic rhinitis    Anxiety    Asthma (HCC)    Depression    Eczema    Migraines      Social History:  Social History     Socioeconomic History    Marital status: Single   Tobacco Use    Smoking status: Some Days     Current packs/day: 0.00     Types: Cigarettes    Smokeless tobacco: Never   Vaping Use    Vaping status: Some Days   Substance and Sexual Activity    Alcohol use: Yes     Alcohol/week: 1.0 standard drink of alcohol     Types: 1 Glasses of wine per week     Comment: social    Drug use: Never   Other Topics Concern    Caffeine Concern No    Exercise No    Seat Belt No    Special Diet No    Stress Concern No    Weight Concern No     Social Drivers of Health     Financial Resource Strain: Unknown (4/22/2022)    [facility]    Overall Financial Resource Strain (CARDIA)     Difficulty of Paying Living Expenses: Patient declined   Food Insecurity: Unknown (4/22/2022)    [facility]    Hunger Vital Sign     Worried About Running Out of Food in the Last Year: Patient declined     Ran Out of Food in the Last Year: Patient declined   Transportation Needs: Unknown (4/22/2022)    [facility]    PRAPARE - Transportation     Lack of Transportation (Medical): Patient declined     Lack of Transportation (Non-Medical): Patient declined    [facility]    Housing Stability        REVIEW OF SYSTEMS:   A comprehensive 10 point review of systems was completed.  Pertinent  positives and negatives noted in the the HPI.      EXAM:   /78   Pulse 107   Temp 98 °F (36.7 °C) (Temporal)   Resp 18   Ht 5' 5\" (1.651 m)   Wt 165 lb (74.8 kg)   LMP 10/26/2024 (Approximate)   SpO2 98%   BMI 27.46 kg/m²   GENERAL: well developed, well nourished,in no apparent distress  SKIN: no rashes,no suspicious lesions  HEENT: atraumatic, normocephalic,ears and throat are clear  NECK: supple,no adenopathy,no bruits  LUNGS: prolonged expiratory phase  CARDIO: RRR without murmur  GI: good BS's,no masses, HSM or tenderness  EXTREMITIES: no cyanosis, clubbing or edema  PSYCH: well groomed, appropriate mood and affect, goo eye contact, normal speech and no thought disorder.    NOTE TO PATIENT: The 21st Century Cures Act makes clinical notes like these available to patients in the interest of transparency. Clinical notes are medical documents used by physicians and care providers to communicate with each other. These documents include medical language and terminology, abbreviations, and treatment information that may sound technical and at times possibly unfamiliar. In addition, at times, the verbiage may appear blunt or direct. These documents are one tool providers use to communicate relevant information and clinical opinions of the care providers in a way that allows common understanding of the clinical context.      Oneyda Youssef MD    11/18/24 1:05 PM

## 2024-11-18 NOTE — TELEPHONE ENCOUNTER
Approved    Prior authorization approved  Payer: [employer] [facility] Case ID: [id number]    [phone]  Note from payer: Prior authorization in place - if requesting an increased quantity, a quantity limit review can be submitted via fax  Approval Details    Authorized from January 1, 2020 to December 31, 2039  Electronic appeal: Not supported  View History    Arnuity ellipta approved.

## 2024-12-03 ENCOUNTER — TELEPHONE (OUTPATIENT)
Dept: FAMILY MEDICINE CLINIC | Facility: CLINIC | Age: 19
End: 2024-12-03

## 2024-12-03 NOTE — TELEPHONE ENCOUNTER
Left Message for patient to come to the office to  the forms completion that Dr. Youssef completed.  The completed form is located at the  and a copy has been sent to scanning.   
Detail Level: Detailed
Sotyktu Counseling:  I discussed the most common side effects of Sotyktu including: common cold, sore throat, sinus infections, cold sores, canker sores, folliculitis, and acne.  I also discussed more serious side effects of Sotyktu including but not limited to: serious allergic reactions; increased risk for infections such as TB; cancers such as lymphomas; rhabdomyolysis and elevated CPK; and elevated triglycerides and liver enzymes.

## 2024-12-26 ENCOUNTER — PATIENT MESSAGE (OUTPATIENT)
Dept: FAMILY MEDICINE CLINIC | Facility: CLINIC | Age: 19
End: 2024-12-26

## 2024-12-26 NOTE — TELEPHONE ENCOUNTER
Future Appointments         Provider Department Appt Notes    In 1 week Oneyda Youssef MD [facility], [address] [place] [address] Period issues/reproductive concerns (see 12/26 MyChart attachment)    In 3 weeks Rolanda Galvin MD [facility], [address] [place] [address] - OB/GYN Period issues/reproductive concerns.

## 2025-01-03 ENCOUNTER — OFFICE VISIT (OUTPATIENT)
Dept: FAMILY MEDICINE CLINIC | Facility: CLINIC | Age: 20
End: 2025-01-03
Payer: MEDICAID

## 2025-01-03 VITALS
HEIGHT: 65 IN | SYSTOLIC BLOOD PRESSURE: 120 MMHG | DIASTOLIC BLOOD PRESSURE: 70 MMHG | RESPIRATION RATE: 18 BRPM | WEIGHT: 170 LBS | TEMPERATURE: 98 F | BODY MASS INDEX: 28.32 KG/M2 | HEART RATE: 90 BPM | OXYGEN SATURATION: 99 %

## 2025-01-03 DIAGNOSIS — N92.1 METRORRHAGIA: Primary | ICD-10-CM

## 2025-01-03 DIAGNOSIS — Z30.41 SURVEILLANCE OF PREVIOUSLY PRESCRIBED CONTRACEPTIVE PILL: ICD-10-CM

## 2025-01-03 DIAGNOSIS — F31.9 BIPOLAR 1 DISORDER (HCC): ICD-10-CM

## 2025-01-03 PROCEDURE — 99214 OFFICE O/P EST MOD 30 MIN: CPT | Performed by: FAMILY MEDICINE

## 2025-01-03 RX ORDER — NORETHINDRONE ACETATE AND ETHINYL ESTRADIOL .02; 1 MG/1; MG/1
1 TABLET ORAL DAILY
Qty: 21 TABLET | Refills: 0 | Status: SHIPPED | OUTPATIENT
Start: 2025-01-03 | End: 2025-02-02

## 2025-01-03 NOTE — PROGRESS NOTES
Family Medicine Progress Note  ASSESSMENT AND PLAN:  Jassi Deleon is a 19 year old female who is here for:     Jassi was seen today for menstrual problem.    Diagnoses and all orders for this visit:    Metrorrhagia  -     Norethindrone Acet-Ethinyl Est (LOESTRIN 1/20, 21,) 1-20 MG-MCG Oral Tab; Take 1 tablet by mouth daily.  -     ocp changed from Lo Loestrin to Loestrin  -     advised to keep appointment with gyn    Surveillance of previously prescribed contraceptive pill  -     Norethindrone Acet-Ethinyl Est (LOESTRIN 1/20, 21,) 1-20 MG-MCG Oral Tab; Take 1 tablet by mouth daily.    Bipolar 1 disorder (HCC)  -     [facility] Referral - In Network         The patient indicates understanding of these issues and agrees to the plan.  Follow-Up: The patient is asked to return in Return in about 1 month (around 2/3/2025) for menstrual issues.  .     Oneyda Youssef MD   01/03/25      CC: Menstrual Problem (Period issues she is having 2 period in 1 month since last summer )    HPI:   Jassi Deleon is a 19 year old female who presents for Menstrual Problem (Period issues she is having 2 period in 1 month since last summer )     States periods have been irregular    LMP Nov 26th bleeding lasted until 2nd of dec, started again Dec 6ht to Dec 11th and then Dec 20th -27th    Period was irregular in June 9th -17th and then 25th-29th    Started off light with spotting and then regular to medium flow and then heavy and latr in June was spotting, regular flow and then spotting.  Patient states has happened before too but does not recall the dates.  States does have an appointment with gyn in 10 days.    Currently not seeing a psychiatrist for her bipolar 1 disorder states does have medication and has continued to take it. Denies depression or feeling Manic at this time. Has had a hard time finding a psychiatrist to establish care.    ALLERGY:     Allergies as of 01/03/2025 - Review Complete 01/03/2025   Allergen  Reaction Noted    Latex HIVES, ITCHING, and RASH 06/23/2022    Lanolin OTHER (SEE COMMENTS) 02/11/2010    Ra pure aloe OTHER (SEE COMMENTS) 02/11/2010     MEDICATIONS:     Current Outpatient Medications   Medication Sig Dispense Refill    fluticasone furoate (ARNUITY ELLIPTA) 200 MCG/ACT Inhalation Aerosol Powder, Breath Activated Inhale 1 puff into the lungs daily. 30 each 1    LO LOESTRIN FE 1 MG-10 MCG / 10 MCG Oral Tab Take 1 tablet by mouth daily. 84 tablet 3    QUEtiapine 300 MG Oral Tab Take 1 tablet (300 mg total) by mouth 2 (two) times daily.      albuterol 108 (90 Base) MCG/ACT Inhalation Aero Soln Inhale 2 puffs into the lungs every 4 (four) hours as needed for Shortness of Breath.      hydrOXYzine HCl 25 MG Oral Tab Take 1 to 2 tablets 30 min before bed as needed for itching. Start with 1 tab and may increase to 2 as needed.        Past Medical History:    Allergic rhinitis    Anxiety    Asthma (HCC)    Depression    Eczema    Migraines      Social History:  Social History     Socioeconomic History    Marital status: Single   Tobacco Use    Smoking status: Some Days     Current packs/day: 0.00     Types: Cigarettes    Smokeless tobacco: Never   Vaping Use    Vaping status: Some Days   Substance and Sexual Activity    Alcohol use: Yes     Alcohol/week: 1.0 standard drink of alcohol     Types: 1 Glasses of wine per week     Comment: social    Drug use: Never   Other Topics Concern    Caffeine Concern No    Exercise No    Seat Belt No    Special Diet No    Stress Concern No    Weight Concern No     Social Drivers of Health     Financial Resource Strain: Unknown (4/22/2022)    [facility], [facility]    Overall Financial Resource Strain (CARDIA)     Difficulty of Paying Living Expenses: Patient declined   Food Insecurity: Unknown (4/22/2022)    [facility] from [facility], [facility]    Hunger Vital Sign      Worried About Running Out of Food in the Last Year: Patient declined     Ran Out of Food in the Last Year: Patient declined   Transportation Needs: Unknown (4/22/2022)    [facility], [facility]    PRAPARE - Transportation     Lack of Transportation (Medical): Patient declined     Lack of Transportation (Non-Medical): Patient declined    [facility] from [facility], [facility]    Housing Stability        REVIEW OF SYSTEMS:   A comprehensive 10 point review of systems was completed.  Pertinent positives and negatives noted in the the HPI.    EXAM:   /70   Pulse 90   Temp 98.2 °F (36.8 °C) (Temporal)   Resp 18   Ht 5' 5\" (1.651 m)   Wt 170 lb (77.1 kg)   LMP 12/20/2024 (Approximate)   SpO2 99%   BMI 28.29 kg/m²   GENERAL: well developed, well nourished,in no apparent distress  NECK: no thyromegaly  LUNGS: clear to auscultation  CARDIO: RRR without murmur  PSYCH: well groomed, appropriate mood and affect, good eye contact, normal speech and no thought disorder.      NOTE TO PATIENT: The 21st Century Cures Act makes clinical notes like these available to patients in the interest of transparency. Clinical notes are medical documents used by physicians and care providers to communicate with each other. These documents include medical language and terminology, abbreviations, and treatment information that may sound technical and at times possibly unfamiliar. In addition, at times, the verbiage may appear blunt or direct. These documents are one tool providers use to communicate relevant information and clinical opinions of the care providers in a way that allows common understanding of the clinical context.      Oneyda Youssef MD    01/03/25 1:21 PM

## 2025-01-06 DIAGNOSIS — N92.1 METRORRHAGIA: ICD-10-CM

## 2025-01-06 DIAGNOSIS — Z30.41 SURVEILLANCE OF PREVIOUSLY PRESCRIBED CONTRACEPTIVE PILL: ICD-10-CM

## 2025-01-07 RX ORDER — NORETHINDRONE ACETATE AND ETHINYL ESTRADIOL .02; 1 MG/1; MG/1
1 TABLET ORAL DAILY
Qty: 84 TABLET | Refills: 0 | OUTPATIENT
Start: 2025-01-07 | End: 2025-02-06

## 2025-01-07 NOTE — TELEPHONE ENCOUNTER
Loestrin 1/20: 28 day supply sent to [facility] in [place] on 01/03/2025-short supply given- new medication

## 2025-01-17 ENCOUNTER — OFFICE VISIT (OUTPATIENT)
Dept: OBGYN CLINIC | Facility: CLINIC | Age: 20
End: 2025-01-17
Payer: MEDICAID

## 2025-01-17 VITALS — WEIGHT: 168 LBS | SYSTOLIC BLOOD PRESSURE: 124 MMHG | DIASTOLIC BLOOD PRESSURE: 88 MMHG | BODY MASS INDEX: 28 KG/M2

## 2025-01-17 DIAGNOSIS — N92.0 MENORRHAGIA WITH REGULAR CYCLE: Primary | ICD-10-CM

## 2025-01-17 DIAGNOSIS — Z11.3 SCREENING EXAMINATION FOR STD (SEXUALLY TRANSMITTED DISEASE): ICD-10-CM

## 2025-01-17 DIAGNOSIS — N76.0 VAGINITIS AND VULVOVAGINITIS: ICD-10-CM

## 2025-01-17 PROCEDURE — 87591 N.GONORRHOEAE DNA AMP PROB: CPT | Performed by: OBSTETRICS & GYNECOLOGY

## 2025-01-17 PROCEDURE — 99204 OFFICE O/P NEW MOD 45 MIN: CPT | Performed by: OBSTETRICS & GYNECOLOGY

## 2025-01-17 PROCEDURE — 87491 CHLMYD TRACH DNA AMP PROBE: CPT | Performed by: OBSTETRICS & GYNECOLOGY

## 2025-01-17 PROCEDURE — 81514 NFCT DS BV&VAGINITIS DNA ALG: CPT | Performed by: OBSTETRICS & GYNECOLOGY

## 2025-01-17 NOTE — PROGRESS NOTES
[facility]  Obstetrics and Gynecology Referral  History & Physical    CC: Patient is a new patient and presenting for evaluation of menorrhagia    Subjective:     HPI: Jassi Deleon is a 19 year old  female referred for menorrhagia.     Patient reports a longstanding history of heavy menstrual bleeding and cramping with [place] pain. She was seen by her PCP Dr. Youssef on 1/3 and switched from Lo Loestrin to Loestrin.  She notes she was previously on Lo Loestrin for 6 months and was satisfied with the improvement in her cramping but noted breakthrough bleeding. She just started the Loestrin and notes increased vaginal discharge that is irritating and bothersome in addition to lower back pain and cramping that has worsened. She used to work at [employer] and recently quit her job, noting she has more freedom now. Her mother is also my patient and recently underwent a bilateral salpingectomy and is also doing well.     OB History:  OB History    Para Term  AB Living   0 0 0 0 0 0   SAB IAB Ectopic Multiple Live Births   0 0 0 0 0       Gyne History:  Menarche: 8 stopped then 9  Period Cycle (Days): 2 weeks before BC switch, previously normal  Period Duration (Days): 7-9 days  Period Flow: light to heavy tapers down  Use of Birth Control (if yes, specify type): OCP  Hx Prior Abnormal Pap: No  Pap Date: 24  Pap Result Notes: [facility]  Patient's last menstrual period was 2024 (approximate).      Denies history of STDs (non-HPV).   Sexual history: currently Active with male partner (bf) of 1 year  Birth control? Loestrin    Pap History:   -2024 NILM, HPV neg     Meds:  Medications Ordered Prior to Encounter[1]    All:  Allergies[2]    PMH:  Past Medical History:    Allergic rhinitis    Anxiety    Asthma (HCC)    Depression    Eczema    Migraines       PSH:  History reviewed. No pertinent surgical history.    Social History:  Social History     Socioeconomic  History    Marital status: Single     Spouse name: Not on file    Number of children: Not on file    Years of education: Not on file    Highest education level: Not on file   Occupational History    Not on file   Tobacco Use    Smoking status: Some Days     Current packs/day: 0.00     Types: Cigarettes    Smokeless tobacco: Never   Vaping Use    Vaping status: Some Days   Substance and Sexual Activity    Alcohol use: Yes     Alcohol/week: 1.0 standard drink of alcohol     Types: 1 Glasses of wine per week     Comment: social    Drug use: Never    Sexual activity: Not on file   Other Topics Concern    Caffeine Concern No    Exercise No    Seat Belt No    Special Diet No    Stress Concern No    Weight Concern No   Social History Narrative    Not on file     Social Drivers of Health     Financial Resource Strain: Unknown (4/22/2022)    [facility]    Overall Financial Resource Strain (CARDIA)     Difficulty of Paying Living Expenses: Patient declined   Food Insecurity: Unknown (4/22/2022)    [facility]    Hunger Vital Sign     Worried About Running Out of Food in the Last Year: Patient declined     Ran Out of Food in the Last Year: Patient declined   Transportation Needs: Unknown (4/22/2022)    [facility]    PRAPARE - Transportation     Lack of Transportation (Medical): Patient declined     Lack of Transportation (Non-Medical): Patient declined   Physical Activity: Not on file   Stress: Not on file   Social Connections: Not on file   Housing Stability: Low Risk  (2/12/2024)    [facility] from [facility], [facility]    Housing Stability     Mortgage Payment Concerns?: Not on file     Number of Places Lived in the Last Year: Not on file     Unstable Housing?: Not on file          Family History:  Family History   Problem Relation Age of Onset    Hypertension Mother     Heart Disorder Father     Cancer Paternal Grandmother     Dementia Paternal Grandmother        Review of Systems:  General: no complaints per category. See HPI for additional information.   Breast: no complaints per category. See HPI for additional information.   Respiratory: no complaints per category. See HPI for additional information.   Cardiovascular: no complaints per category. See HPI for additional information.   GI: no complaints per category. See HPI for additional information.   : no complaints per category. See HPI for additional information.   Heme: no complaints per category. See HPI for additional information.       Objective:     Vitals:    25 1343   BP: 124/88   Weight: 168 lb (76.2 kg)         Body mass index is 27.96 kg/m².    General: AAO.NAD.   CVS exam: normal peripheral perfusion  Chest: non-labored breathing, no tachypnea   Breast: deferred  Abdominal exam: soft, nontender, nondistended  Pelvic exam:   VULVA: normal appearing vulva with no masses, tenderness or lesions  PERINEUM:  normal appearing, no lesions   URETHRAL MEATUS:  normal appearing, no lesions   VAGINA: normal appearing vagina with normal color and discharge, no lesions  CERVIX: normal appearing cervix without lesions, thick white discharge present  UTERUS: uterus is normal size, shape, consistency and nontender  ADNEXA: normal adnexa in size, nontender and no masses  PERIRECTAL: normal appearing, no lesions   Ext: non-tender, no edema    Assessment:     Jassi Deleon is a 19 year old  female referred for menorrhagia.       Plan:     Problem List Items Addressed This Visit    None  Visit Diagnoses       Menorrhagia with regular cycle    -  Primary    Relevant Orders    Culture Urine Routine    US PELVIS (TRANSABDOMINAL AND TRANSVAGINAL) (CPT=76856/98935)    Vaginitis and vulvovaginitis        Relevant Orders     Vaginitis Vaginosis PCR Panel    Screening examination for STD (sexually transmitted disease)        Relevant Orders    Chlamydia/Gc Amplification          Menorrhagia  -Discussed different methods of contraception, specifically recommended combined OCPs, Progesterone-only pills, and levonorgestrel IUD for management   - Risks and benefits reviewed, discussed options for pain control with IUD placement if indicated   - Given that patient was just switched from Lo Loestrin to Loestrin, recommended continuation for 3-6 months prior to reassessment. Patient states that she would prefer different form of mediation or IUD placement in 6 months if no improvement in symptoms  - Pelvic ultrasound ordered to rule out structural causes of menorrhagia     Vaginal discharge  - Urine culture, vaginosis, and GCCT swabs obtained for abnormal vaginal discharge  - Reviewed increased vaginal discharge as side effect of progesterone in medications. Patient voiced understanding.     I will follow up with the patient's imaging and results over MyChart, and an appointment can be scheduled if indicated after that time.     All of the findings and plan were discussed with the patient.  She notes understanding and agrees with the plan of care.  All questions were answered to the best of my ability at this time.      Total patient time was 30 minutes in evaluation, consultation, and coordination of care.  This included face to face and non-face to face actions. The patient's questions and concerns were addressed.     RTC in 6 months for follow up     Rolanda Galvin MD   [facility] - OBGYN      Note to patient and family   The 21st Century Cures Act makes medical notes available to patients in the interest of transparency.  However, please be advised that this is a medical document.  It is intended as [id number] communication.  It is written and medical language may contain abbreviations or verbiage that are technical and unfamiliar.  It may appear  blunt or direct.  Medical documents are intended to carry relevant information, facts as evident, and the clinical opinion of the practitioner.               [1]   Current Outpatient Medications on File Prior to Visit   Medication Sig Dispense Refill    Norethindrone Acet-Ethinyl Est (LOESTRIN 1/20, 21,) 1-20 MG-MCG Oral Tab Take 1 tablet by mouth daily. 21 tablet 0    fluticasone furoate (ARNUITY ELLIPTA) 200 MCG/ACT Inhalation Aerosol Powder, Breath Activated Inhale 1 puff into the lungs daily. 30 each 1    QUEtiapine 300 MG Oral Tab Take 1 tablet (300 mg total) by mouth 2 (two) times daily.      albuterol 108 (90 Base) MCG/ACT Inhalation Aero Soln Inhale 2 puffs into the lungs every 4 (four) hours as needed for Shortness of Breath.      hydrOXYzine HCl 25 MG Oral Tab Take 1 to 2 tablets 30 min before bed as needed for itching. Start with 1 tab and may increase to 2 as needed.       No current facility-administered medications on file prior to visit.   [2]   Allergies  Allergen Reactions    Latex HIVES, ITCHING and RASH    Lanolin OTHER (SEE COMMENTS)    Ra Pure Aloe OTHER (SEE COMMENTS)

## 2025-01-18 LAB
BV BACTERIA DNA VAG QL NAA+PROBE: NEGATIVE
C GLABRATA DNA VAG QL NAA+PROBE: NEGATIVE
C KRUSEI DNA VAG QL NAA+PROBE: NEGATIVE
CANDIDA DNA VAG QL NAA+PROBE: POSITIVE
T VAGINALIS DNA VAG QL NAA+PROBE: NEGATIVE

## 2025-01-20 LAB
C TRACH DNA SPEC QL NAA+PROBE: NEGATIVE
N GONORRHOEA DNA SPEC QL NAA+PROBE: NEGATIVE

## 2025-01-20 RX ORDER — CLOTRIMAZOLE 1 %
5 CREAM WITH APPLICATOR VAGINAL NIGHTLY
Qty: 45 G | Refills: 2 | Status: SHIPPED | OUTPATIENT
Start: 2025-01-20

## 2025-02-05 ENCOUNTER — TELEMEDICINE (OUTPATIENT)
Dept: FAMILY MEDICINE CLINIC | Facility: CLINIC | Age: 20
End: 2025-02-05
Payer: MEDICAID

## 2025-02-05 DIAGNOSIS — N92.1 METRORRHAGIA: ICD-10-CM

## 2025-02-05 DIAGNOSIS — Z30.41 SURVEILLANCE OF PREVIOUSLY PRESCRIBED CONTRACEPTIVE PILL: ICD-10-CM

## 2025-02-05 PROCEDURE — 99213 OFFICE O/P EST LOW 20 MIN: CPT | Performed by: FAMILY MEDICINE

## 2025-02-05 RX ORDER — NORETHINDRONE ACETATE AND ETHINYL ESTRADIOL .02; 1 MG/1; MG/1
1 TABLET ORAL DAILY
Qty: 21 TABLET | Refills: 2 | Status: SHIPPED | OUTPATIENT
Start: 2025-02-05

## 2025-02-05 RX ORDER — NORETHINDRONE ACETATE AND ETHINYL ESTRADIOL .02; 1 MG/1; MG/1
1 TABLET ORAL DAILY
Qty: 84 TABLET | Refills: 0 | OUTPATIENT
Start: 2025-02-05

## 2025-02-05 NOTE — PROGRESS NOTES
Family Medicine Progress Note  ASSESSMENT AND PLAN:  Jassi Deleon is a 19 year old female who is here for:     Jassi was seen today for contraception.    Diagnoses and all orders for this visit:    Metrorrhagia  -     Norethindrone Acet-Ethinyl Est (LOESTRIN 1/20, 21,) 1-20 MG-MCG Oral Tab; Take 1 tablet by mouth daily.  - advised to continue ocp as prescribed for the next 2 months and if she continues to experience side effects to follow up with gyn.    Surveillance of previously prescribed contraceptive pill  -     Norethindrone Acet-Ethinyl Est (LOESTRIN 1/20, 21,) 1-20 MG-MCG Oral Tab; Take 1 tablet by mouth daily.         The patient indicates understanding of these issues and agrees to the plan.  Follow-Up: The patient is asked to Return if symptoms worsen or fail to improve.  .     Oneyda Youssef MD   02/05/25      CC: Contraception (BC gave her a yeast infection)    This visit is conducted using telemedicine with live interactive video and audio. Jassi Deleon  verbally consents to virtual video visit.   Patient understands and accepts financial responsibility for any deductible and/or co-pays associated with the service.    HPI:   Jassi Deleon is a 19 year old female who presents for Contraception (BC gave her a yeast infection)     Patient is seen for follow up after her OCP was changed. States first couple of weeks had nausea, headache and bloating, on and off breast tenderness, on and off pelvic pain, BP was high when she saw ,  had a yeat infectino, better now, nausea and occasional headache. Had a little spotting this week after she finished the pack.   Was told to continue on the ocp by  and if still having side effects to follow up with her.    ALLERGY:     Allergies as of 02/05/2025 - Review Complete 02/05/2025   Allergen Reaction Noted    Latex HIVES, ITCHING, and RASH 06/23/2022    Lanolin OTHER (SEE COMMENTS) 02/11/2010    Ra pure aloe OTHER (SEE COMMENTS)  02/11/2010     MEDICATIONS:     Current Outpatient Medications   Medication Sig Dispense Refill    clotrimazole 1 % Vaginal Cream Place 5 g vaginally at bedtime. For 7 days 45 g 2    fluticasone furoate (ARNUITY ELLIPTA) 200 MCG/ACT Inhalation Aerosol Powder, Breath Activated Inhale 1 puff into the lungs daily. 30 each 1    QUEtiapine 300 MG Oral Tab Take 1 tablet (300 mg total) by mouth 2 (two) times daily.      albuterol 108 (90 Base) MCG/ACT Inhalation Aero Soln Inhale 2 puffs into the lungs every 4 (four) hours as needed for Shortness of Breath.      hydrOXYzine HCl 25 MG Oral Tab Take 1 to 2 tablets 30 min before bed as needed for itching. Start with 1 tab and may increase to 2 as needed.      Norethindrone Acet-Ethinyl Est (LOESTRIN 1/20, 21,) 1-20 MG-MCG Oral Tab Take 1 tablet by mouth daily. 21 tablet 0      Past Medical History:    Allergic rhinitis    Anxiety    Asthma (HCC)    Depression    Eczema    Migraines      Social History:  Social History     Socioeconomic History    Marital status: Single   Tobacco Use    Smoking status: Some Days     Current packs/day: 0.00     Types: Cigarettes    Smokeless tobacco: Never   Vaping Use    Vaping status: Some Days   Substance and Sexual Activity    Alcohol use: Yes     Alcohol/week: 2.0 standard drinks of alcohol     Types: 1 Glasses of wine, 1 Cans of beer per week     Comment: social    Drug use: Never   Other Topics Concern    Caffeine Concern No    Exercise No    Seat Belt No    Special Diet No    Stress Concern No    Weight Concern No     Social Drivers of Health     Food Insecurity: Unknown (4/22/2022)    [facility]    Hunger Vital Sign     Worried About Running Out of Food in the Last Year: Patient declined     Ran Out of Food in the Last Year: Patient declined   Transportation Needs: Unknown (4/22/2022)    [facility]     PRAPARE - Transportation     Lack of Transportation (Medical): Patient declined     Lack of Transportation (Non-Medical): Patient declined    [facility]    Housing Stability        REVIEW OF SYSTEMS:   A comprehensive 10 point review of systems was completed.  Pertinent positives and negatives noted in the the HPI.    EXAM:   LMP 01/07/2025 (Approximate)   GENERAL: well developed, well nourished,in no apparent distress  HEENT: atraumatic, normocephalic  NECK: supple  LUNGS: act of breathing is normal  CARDIO: speaking in full sentences without any distress  NEURO: AAOx3      NOTE TO PATIENT: The 21st Century Cures Act makes clinical notes like these available to patients in the interest of transparency. Clinical notes are medical documents used by physicians and care providers to communicate with each other. These documents include medical language and terminology, abbreviations, and treatment information that may sound technical and at times possibly unfamiliar. In addition, at times, the verbiage may appear blunt or direct. These documents are one tool providers use to communicate relevant information and clinical opinions of the care providers in a way that allows common understanding of the clinical context.      Oneyda Youssef MD

## 2025-02-28 ENCOUNTER — HOSPITAL ENCOUNTER (OUTPATIENT)
Dept: ULTRASOUND IMAGING | Age: 20
Discharge: HOME | End: 2025-02-28
Attending: OBSTETRICS & GYNECOLOGY
Payer: MEDICAID

## 2025-02-28 DIAGNOSIS — N92.0 MENORRHAGIA WITH REGULAR CYCLE: ICD-10-CM

## 2025-02-28 PROCEDURE — 76830 TRANSVAGINAL US NON-OB: CPT | Performed by: OBSTETRICS & GYNECOLOGY

## 2025-02-28 PROCEDURE — 76856 US EXAM PELVIC COMPLETE: CPT | Performed by: OBSTETRICS & GYNECOLOGY

## 2025-03-03 ENCOUNTER — TELEMEDICINE (OUTPATIENT)
Dept: FAMILY MEDICINE CLINIC | Facility: CLINIC | Age: 20
End: 2025-03-03
Payer: MEDICAID

## 2025-03-03 DIAGNOSIS — R53.83 FATIGUE, UNSPECIFIED TYPE: Primary | ICD-10-CM

## 2025-03-03 DIAGNOSIS — G47.00 INSOMNIA, UNSPECIFIED TYPE: ICD-10-CM

## 2025-03-03 NOTE — PROGRESS NOTES
Jassi Deleon is a 19 year old female.    HPI:   ***  Current Outpatient Medications   Medication Sig Dispense Refill    Norethindrone Acet-Ethinyl Est (LOESTRIN 1/20, 21,) 1-20 MG-MCG Oral Tab Take 1 tablet by mouth daily. 21 tablet 2    clotrimazole 1 % Vaginal Cream Place 5 g vaginally at bedtime. For 7 days 45 g 2    fluticasone furoate (ARNUITY ELLIPTA) 200 MCG/ACT Inhalation Aerosol Powder, Breath Activated Inhale 1 puff into the lungs daily. 30 each 1    QUEtiapine 300 MG Oral Tab Take 1 tablet (300 mg total) by mouth 2 (two) times daily.      albuterol 108 (90 Base) MCG/ACT Inhalation Aero Soln Inhale 2 puffs into the lungs every 4 (four) hours as needed for Shortness of Breath.      hydrOXYzine HCl 25 MG Oral Tab Take 1 to 2 tablets 30 min before bed as needed for itching. Start with 1 tab and may increase to 2 as needed.        Past Medical History:    Allergic rhinitis    Anxiety    Asthma (HCC)    Depression    Eczema    Migraines      Social History:  Social History     Socioeconomic History    Marital status: Single   Tobacco Use    Smoking status: Some Days     Current packs/day: 0.00     Types: Cigarettes    Smokeless tobacco: Never   Vaping Use    Vaping status: Some Days   Substance and Sexual Activity    Alcohol use: Yes     Alcohol/week: 2.0 standard drinks of alcohol     Types: 1 Glasses of wine, 1 Cans of beer per week     Comment: social    Drug use: Never   Other Topics Concern    Caffeine Concern No    Exercise No    Seat Belt No    Special Diet No    Stress Concern No    Weight Concern No     Social Drivers of Health     Food Insecurity: Unknown (4/22/2022)    [facility], [facility]    Hunger Vital Sign     Worried About Running Out of Food in the Last Year: Patient declined     Ran Out of Food in the Last Year: Patient declined   Transportation Needs: Unknown (4/22/2022)    Received from [facility]  [facility]    PRAPARE - Transportation     Lack of Transportation (Medical): Patient declined     Lack of Transportation (Non-Medical): Patient declined    [facility], [facility]    Housing Stability        REVIEW OF SYSTEMS:   GENERAL HEALTH: feels well otherwise  SKIN: denies any unusual skin lesions or rashes  RESPIRATORY: denies shortness of breath with exertion  CARDIOVASCULAR: denies chest pain on exertion  GI: denies abdominal pain and denies heartburn  NEURO: denies headaches    EXAM:   LMP 01/07/2025 (Approximate)   GENERAL: well developed, well nourished,in no apparent distress  SKIN: no rashes,no suspicious lesions  HEENT: atraumatic, normocephalic,ears and throat are clear  NECK: supple,no adenopathy,no bruits  LUNGS: clear to auscultation  CARDIO: RRR without murmur  GI: good BS's,no masses, HSM or tenderness  EXTREMITIES: no cyanosis, clubbing or edema    ASSESSMENT AND PLAN:

## 2025-03-03 NOTE — PATIENT INSTRUCTIONS
SLEEP HYGIENE:    Eliminate stimulants like ( Caffeine) after 1 PM, if still with sleeping difficulties, then keep moving back the last caffeine intake  Discontinue daytime naps  Regular exercise improves sleep but do not exercise within 3 hours of your bedtime.  Move dinner to at least 4 hrs before bedtime  No fluids 2 hrs before bedtime.  Keep your bedroom dark and cool  Avoid all electronics before bedtime  Go to bed at the same time daily  White noise like a fan or relaxing music will help  Watch You tube Bebo Robertson sleep hypnosis    With depression and insomnia Bright light therapy helps with seasonal depression, 10 L lux located vertically and placed 1 meter away for 30-90 min after awakening    Please consider CBT.

## 2025-03-03 NOTE — PROGRESS NOTES
Family Medicine Progress Note  ASSESSMENT AND PLAN:  Jassi Deleon is a 19 year old female who is here for:     Jassi was seen today for fatigue.    Diagnoses and all orders for this visit:    Fatigue, unspecified type  -     CBC W Differential W Platelet [E]; Future  -     Iron And Tibc [E]; Future  -     Ferritin [E]; Future  -     TSH [E]; Future  -     Comp Metabolic Panel (14) [E]; Future  -     Vitamin D [E]; Future    Insomnia, unspecified type         -  likely psychophysiological         - advised good sleep hygiene.        The patient indicates understanding of these issues and agrees to the plan.  Follow-Up: The patient is asked to return in Return if symptoms worsen or fail to improve.  .     Oneyda Youssef MD   03/03/25      CC: Fatigue    This visit is conducted using telemedicine with live interactive video and audio. Jassi Deleon  verbally consents to virtual video visit.   Patient understands and accepts financial responsibility for any deductible and/or co-pays associated with the service.    HPI:   Jassi Deleon is a 19 year old female who presents for Fatigue     Patient states has been feeling very fatigued over the past few months, states menstrual cycle does tend to be a little bit on the heavier side.  Has not checked any labs in a while and would like an order.  Blood work to be done.    Does have issues with sleep, states follows sleep hygiene routine every day before she goes to bed.  Does not have any trouble falling asleep but does wake up frequently.    ALLERGY:     Allergies as of 03/03/2025 - Review Complete 02/05/2025   Allergen Reaction Noted    Latex HIVES, ITCHING, and RASH 06/23/2022    Lanolin OTHER (SEE COMMENTS) 02/11/2010    Ra pure aloe OTHER (SEE COMMENTS) 02/11/2010       MEDICATIONS:     Current Outpatient Medications   Medication Sig Dispense Refill    Norethindrone Acet-Ethinyl Est (LOESTRIN 1/20, 21,) 1-20 MG-MCG Oral Tab Take 1 tablet by mouth  daily. 21 tablet 2    clotrimazole 1 % Vaginal Cream Place 5 g vaginally at bedtime. For 7 days 45 g 2    fluticasone furoate (ARNUITY ELLIPTA) 200 MCG/ACT Inhalation Aerosol Powder, Breath Activated Inhale 1 puff into the lungs daily. 30 each 1    QUEtiapine 300 MG Oral Tab Take 1 tablet (300 mg total) by mouth 2 (two) times daily.      albuterol 108 (90 Base) MCG/ACT Inhalation Aero Soln Inhale 2 puffs into the lungs every 4 (four) hours as needed for Shortness of Breath.      hydrOXYzine HCl 25 MG Oral Tab Take 1 to 2 tablets 30 min before bed as needed for itching. Start with 1 tab and may increase to 2 as needed.        Past Medical History:    Allergic rhinitis    Anxiety    Asthma (HCC)    Depression    Eczema    Migraines      Social History:  Social History     Socioeconomic History    Marital status: Single   Tobacco Use    Smoking status: Some Days     Current packs/day: 0.00     Types: Cigarettes    Smokeless tobacco: Never   Vaping Use    Vaping status: Some Days   Substance and Sexual Activity    Alcohol use: Yes     Alcohol/week: 2.0 standard drinks of alcohol     Types: 1 Glasses of wine, 1 Cans of beer per week     Comment: social    Drug use: Never   Other Topics Concern    Caffeine Concern No    Exercise No    Seat Belt No    Special Diet No    Stress Concern No    Weight Concern No     Social Drivers of Health     Food Insecurity: Unknown (4/22/2022)    [facility]    Hunger Vital Sign     Worried About Running Out of Food in the Last Year: Patient declined     Ran Out of Food in the Last Year: Patient declined   Transportation Needs: Unknown (4/22/2022)    [facility]    PRAPARE - Transportation     Lack of Transportation (Medical): Patient declined     Lack of Transportation (Non-Medical): Patient declined    [facility]    Housing Stability        REVIEW OF SYSTEMS:   A comprehensive 10 point review of systems was completed.  Pertinent positives and negatives noted in the the HPI.      EXAM:   LMP 01/07/2025 (Approximate)   GENERAL: well developed, well nourished  NECK: supple  LUNGS: act of breathing is normal  CARDIO: speaking in full sentences without any distress  NEURO: AAO X 3    Please note that the following visit was completed using two-way, real-time interactive audio and video communication.  This has been done in good skye to provide continuity of care . There are limitations of this visit, as no physical exam could be performed.  Visit was planned and structured to allow sufficient time to address the issues presented.  Billing for this visit takes into account review of history, labs, medications, radiology tests , consultations and/or decision making.  Appropriate medical decision-making and tests are ordered as detailed in the assessment and plan of care.    NOTE TO PATIENT: The 21st Century Cures Act makes clinical notes like these available to patients in the interest of transparency. Clinical notes are medical documents used by physicians and care providers to communicate with each other. These documents include medical language and terminology, abbreviations, and treatment information that may sound technical and at times possibly unfamiliar. In addition, at times, the verbiage may appear blunt or direct. These documents are one tool providers use to communicate relevant information and clinical opinions of the care providers in a way that allows common understanding of the clinical context.      Oneyda Youssef MD

## 2025-03-17 DIAGNOSIS — N92.1 METRORRHAGIA: ICD-10-CM

## 2025-03-17 DIAGNOSIS — Z30.41 SURVEILLANCE OF PREVIOUSLY PRESCRIBED CONTRACEPTIVE PILL: ICD-10-CM

## 2025-03-18 RX ORDER — NORETHINDRONE ACETATE AND ETHINYL ESTRADIOL .02; 1 MG/1; MG/1
1 TABLET ORAL DAILY
Qty: 21 TABLET | Refills: 2 | OUTPATIENT
Start: 2025-03-18

## 2025-03-18 NOTE — TELEPHONE ENCOUNTER
Patient comment: Could I please be updated to a 90 day supply?     Gynecology Medication Protocol Passed 03/18/2025 08:23 AM   Protocol Details PASS-PENDING LAST PAP WNL--VIA MANUAL LOOKUP    Medication is active on med list    Physical or Pelvic/Breast in past 12 or next 3 mos--VIA MANUAL LOOKUP        LOV 1/3/25     Medication Detail    Medication Quantity Refills Start End   Norethindrone Acet-Ethinyl Est (LOESTRIN 1/20, 21,) 1-20 MG-MCG Oral Tab 21 tablet 2 2/5/2025 --   Sig:   Take 1 tablet by mouth daily.     Route:   Oral     Order #:   [id number]       Too soon for refill. Refill denied

## 2025-03-30 DIAGNOSIS — M54.6 CHRONIC MIDLINE THORACIC BACK PAIN: ICD-10-CM

## 2025-03-30 DIAGNOSIS — G89.29 CHRONIC MIDLINE THORACIC BACK PAIN: ICD-10-CM

## 2025-04-02 RX ORDER — TIZANIDINE 2 MG/1
2 TABLET ORAL NIGHTLY PRN
Qty: 15 TABLET | Refills: 0 | OUTPATIENT
Start: 2025-04-02

## 2025-04-02 NOTE — TELEPHONE ENCOUNTER
Tizanidine 2mg: short supply (15 tablets) was given to patient on 07/22/2024.-sent triage message to patient to call the office

## (undated) NOTE — LETTER
Date & Time: 8/1/2023, 7:48 PM  Patient: Lalit Camargo  Encounter Provider(s):    Hany Vivas MD       To Whom It May Concern:    Yen Boston was seen and treated in our department on 8/1/2023. She should not return to work until 8/4/23 .     If you have any questions or concerns, please do not hesitate to call.        _____________________________  Physician/APC Signature

## (undated) NOTE — LETTER
Date & Time: 11/4/2024, 7:07 PM  Patient: Jassi Deleon  Encounter Provider(s):    Yeny Gomez APRN       To Whom It May Concern:    Jassi Deleon was seen and treated in our department on 11/4/2024. She should not return to work until 11/07/2024 .    If you have any questions or concerns, please do not hesitate to call.        Yeny Gomez NP-C  Nurse Practitioner    This note has been electronically signed

## (undated) NOTE — LETTER
ASTHMA ACTION PLAN for Jassi Deleon     : 3/17/2005     Date: 9/3/2024  Provider:  Oneyda Youssef MD  Phone for doctor or clinic: [facility], [address] [place] [id number] [phone]           You can use the colors of a traffic light to help learn about your asthma medicines.      1. Green - Go! % of Personal Best Peak Flow Use controller medicine.   Breathing is good  No cough or wheeze  Can work and play Medicine How much to take When to take it    Arnuity ellipta               1 puff                                 bid      2. Yellow - Caution. 50-79% Personal Best Peak  Flow.  Use reliever medicine to keep an asthma attack from getting bad.   Cough  Wheezing  Tight Chest  Wake up at night Medicine How much to take When to take it    Albuterol inhaler,  2 puffs every four hours as needed.        Additional instructions         3. Red - Stop! Danger!  <50% Personal Best Peak  Flow. Take these medications until  Get help from a doctor   Medicine not helping  Breathing is hard and fast  Nose opens wide  Can't walk  Ribs show  Can't talk well Medicine How much to take When to take it    Go to the nearest Emergency Room/Department right now!      Additional Instructions If your symptoms do not improve and you cannot contact your doctor, [facility] or call [facility] immediately!     [x] Asthma Action Plan reviewed with patient (and caregiver if necessary) and a copy of the plan was given to the patient/caregiver.   [] Asthma Action Plan reviewed with patient (and caregiver if necessary) on the phone and mailed copy to patient or submitted via [employer].     Signatures:  Provider  Oneyda Youssef MD   Patient Caretaker

## (undated) NOTE — ED AVS SNAPSHOT
Bart Loreto   MRN: [id number]    Department:  [facility] Emergency Department   Date of Visit:  12/14/2019           Disclosure     Insurance plans vary and the physician(s) referred by the ER may not be covered by your plan.  Please contact tell this physician (or your personal doctor if your instructions are to return to your personal doctor) about any new or lasting problems. The primary care or specialist physician will see patients referred from the [facility] Emergency Department.  Brendan Lassiter

## (undated) NOTE — LETTER
Date: 11/6/2023    Patient Name: Blanche Escobedo          To Whom it may concern: The above patient was seen at the [facility] for treatment of a medical condition. This patient should be excused from attending work/school from 11/6/23 and 11/7/23.     The patient may return to work/school on 11/8/23 as long as patient has a negative covid PCR test.        Sincerely,    COSTA Kirkland

## (undated) NOTE — ED AVS SNAPSHOT
Huyen Fofana   MRN: [id number]    Department:  [facility] Emergency Department   Date of Visit:  8/10/2017           Disclosure     Insurance plans vary and the physician(s) referred by the ER may not be covered by your plan.  Please contact If you have been prescribed any medication(s), please fill your prescription right away and begin taking the medication(s) as directed    If the emergency physician has read X-rays, these will be re-interpreted by a radiologist.  If there is a significant

## (undated) NOTE — LETTER
Date & Time: 4/9/2024, 10:23 AM  Patient: Jassi Deleon  Encounter Provider(s):    Shayy Steele PA       To Whom It May Concern:    Jassi Deleon was seen and treated in our department on 4/9/2024. She should not return to work until fever free without medications for 24 hours .    If you have any questions or concerns, please do not hesitate to call.        _____________________________  Physician/APC Signature